# Patient Record
Sex: MALE | Race: BLACK OR AFRICAN AMERICAN | Employment: FULL TIME | URBAN - METROPOLITAN AREA
[De-identification: names, ages, dates, MRNs, and addresses within clinical notes are randomized per-mention and may not be internally consistent; named-entity substitution may affect disease eponyms.]

---

## 2021-04-20 ENCOUNTER — HOSPITAL ENCOUNTER (INPATIENT)
Age: 31
LOS: 3 days | Discharge: HOME | DRG: 342 | End: 2021-04-24
Attending: EMERGENCY MEDICINE | Admitting: SURGERY
Payer: COMMERCIAL

## 2021-04-20 ENCOUNTER — APPOINTMENT (OUTPATIENT)
Dept: CT IMAGING | Age: 31
DRG: 342 | End: 2021-04-20
Payer: COMMERCIAL

## 2021-04-20 DIAGNOSIS — K35.30 ACUTE APPENDICITIS WITH LOCALIZED PERITONITIS, WITHOUT PERFORATION, ABSCESS, OR GANGRENE: Primary | ICD-10-CM

## 2021-04-20 LAB
ABSOLUTE BANDS #: 0.15 K/UL (ref 0–1)
ABSOLUTE EOS #: 0 K/UL (ref 0–0.4)
ABSOLUTE IMMATURE GRANULOCYTE: ABNORMAL K/UL (ref 0–0.3)
ABSOLUTE LYMPH #: 1.85 K/UL (ref 1–4.8)
ABSOLUTE MONO #: 0.92 K/UL (ref 0.1–1.3)
ALBUMIN SERPL-MCNC: 4.5 G/DL (ref 3.5–5.2)
ALBUMIN/GLOBULIN RATIO: ABNORMAL (ref 1–2.5)
ALP BLD-CCNC: 162 U/L (ref 40–129)
ALT SERPL-CCNC: 8 U/L (ref 5–41)
ANION GAP SERPL CALCULATED.3IONS-SCNC: 15 MMOL/L (ref 9–17)
AST SERPL-CCNC: 16 U/L
BANDS: 1 % (ref 0–10)
BASOPHILS # BLD: 0 % (ref 0–2)
BASOPHILS ABSOLUTE: 0 K/UL (ref 0–0.2)
BILIRUB SERPL-MCNC: 0.47 MG/DL (ref 0.3–1.2)
BUN BLDV-MCNC: 6 MG/DL (ref 6–20)
BUN/CREAT BLD: ABNORMAL (ref 9–20)
CALCIUM SERPL-MCNC: 10 MG/DL (ref 8.6–10.4)
CHLORIDE BLD-SCNC: 97 MMOL/L (ref 98–107)
CO2: 27 MMOL/L (ref 20–31)
CREAT SERPL-MCNC: 0.75 MG/DL (ref 0.7–1.2)
DIFFERENTIAL TYPE: ABNORMAL
EOSINOPHILS RELATIVE PERCENT: 0 % (ref 0–4)
GFR AFRICAN AMERICAN: >60 ML/MIN
GFR NON-AFRICAN AMERICAN: >60 ML/MIN
GFR SERPL CREATININE-BSD FRML MDRD: ABNORMAL ML/MIN/{1.73_M2}
GFR SERPL CREATININE-BSD FRML MDRD: ABNORMAL ML/MIN/{1.73_M2}
GLUCOSE BLD-MCNC: 90 MG/DL (ref 70–99)
HCT VFR BLD CALC: 43.5 % (ref 41–53)
HEMOGLOBIN: 14.6 G/DL (ref 13.5–17.5)
IMMATURE GRANULOCYTES: ABNORMAL %
LYMPHOCYTES # BLD: 12 % (ref 24–44)
MCH RBC QN AUTO: 30.9 PG (ref 26–34)
MCHC RBC AUTO-ENTMCNC: 33.5 G/DL (ref 31–37)
MCV RBC AUTO: 92.3 FL (ref 80–100)
MONOCYTES # BLD: 6 % (ref 1–7)
MORPHOLOGY: NORMAL
NRBC AUTOMATED: ABNORMAL PER 100 WBC
PDW BLD-RTO: 13.6 % (ref 11.5–14.9)
PLATELET # BLD: 473 K/UL (ref 150–450)
PLATELET ESTIMATE: ABNORMAL
PMV BLD AUTO: 7.3 FL (ref 6–12)
POTASSIUM SERPL-SCNC: 3.9 MMOL/L (ref 3.7–5.3)
RBC # BLD: 4.72 M/UL (ref 4.5–5.9)
RBC # BLD: ABNORMAL 10*6/UL
SEG NEUTROPHILS: 81 % (ref 36–66)
SEGMENTED NEUTROPHILS ABSOLUTE COUNT: 12.48 K/UL (ref 1.3–9.1)
SODIUM BLD-SCNC: 139 MMOL/L (ref 135–144)
TOTAL PROTEIN: 8.6 G/DL (ref 6.4–8.3)
WBC # BLD: 15.4 K/UL (ref 3.5–11)
WBC # BLD: ABNORMAL 10*3/UL

## 2021-04-20 PROCEDURE — 85025 COMPLETE CBC W/AUTO DIFF WBC: CPT

## 2021-04-20 PROCEDURE — 36415 COLL VENOUS BLD VENIPUNCTURE: CPT

## 2021-04-20 PROCEDURE — 99284 EMERGENCY DEPT VISIT MOD MDM: CPT

## 2021-04-20 PROCEDURE — 2580000003 HC RX 258: Performed by: EMERGENCY MEDICINE

## 2021-04-20 PROCEDURE — 74177 CT ABD & PELVIS W/CONTRAST: CPT

## 2021-04-20 PROCEDURE — 6360000004 HC RX CONTRAST MEDICATION: Performed by: EMERGENCY MEDICINE

## 2021-04-20 PROCEDURE — 96372 THER/PROPH/DIAG INJ SC/IM: CPT

## 2021-04-20 PROCEDURE — 80053 COMPREHEN METABOLIC PANEL: CPT

## 2021-04-20 RX ORDER — SODIUM CHLORIDE 0.9 % (FLUSH) 0.9 %
10 SYRINGE (ML) INJECTION PRN
Status: DISCONTINUED | OUTPATIENT
Start: 2021-04-20 | End: 2021-04-24 | Stop reason: HOSPADM

## 2021-04-20 RX ORDER — DICYCLOMINE HYDROCHLORIDE 10 MG/ML
20 INJECTION INTRAMUSCULAR ONCE
Status: COMPLETED | OUTPATIENT
Start: 2021-04-20 | End: 2021-04-21

## 2021-04-20 RX ORDER — 0.9 % SODIUM CHLORIDE 0.9 %
80 INTRAVENOUS SOLUTION INTRAVENOUS ONCE
Status: COMPLETED | OUTPATIENT
Start: 2021-04-20 | End: 2021-04-20

## 2021-04-20 RX ADMIN — SODIUM CHLORIDE, PRESERVATIVE FREE 10 ML: 5 INJECTION INTRAVENOUS at 23:09

## 2021-04-20 RX ADMIN — IOPAMIDOL 75 ML: 755 INJECTION, SOLUTION INTRAVENOUS at 23:09

## 2021-04-20 RX ADMIN — SODIUM CHLORIDE 80 ML: 9 INJECTION, SOLUTION INTRAVENOUS at 23:09

## 2021-04-20 ASSESSMENT — ENCOUNTER SYMPTOMS
ABDOMINAL PAIN: 1
NAUSEA: 1
SHORTNESS OF BREATH: 0
COUGH: 0
DIARRHEA: 1
VOMITING: 0
ABDOMINAL DISTENTION: 1

## 2021-04-20 ASSESSMENT — PAIN SCALES - GENERAL: PAINLEVEL_OUTOF10: 10

## 2021-04-21 ENCOUNTER — ANESTHESIA EVENT (OUTPATIENT)
Dept: OPERATING ROOM | Age: 31
DRG: 342 | End: 2021-04-21
Payer: COMMERCIAL

## 2021-04-21 ENCOUNTER — ANESTHESIA (OUTPATIENT)
Dept: OPERATING ROOM | Age: 31
DRG: 342 | End: 2021-04-21
Payer: COMMERCIAL

## 2021-04-21 VITALS — SYSTOLIC BLOOD PRESSURE: 175 MMHG | TEMPERATURE: 99.3 F | DIASTOLIC BLOOD PRESSURE: 94 MMHG | OXYGEN SATURATION: 98 %

## 2021-04-21 PROBLEM — K35.80 ACUTE APPENDICITIS: Status: ACTIVE | Noted: 2021-04-21

## 2021-04-21 LAB
C DIFFICILE TOXINS, PCR: ABNORMAL
DIRECT EXAM: NORMAL
HCG QUALITATIVE: NEGATIVE
Lab: NORMAL
Lab: NORMAL
SARS-COV-2, RAPID: NOT DETECTED
SPECIMEN DESCRIPTION: ABNORMAL
SPECIMEN DESCRIPTION: NORMAL

## 2021-04-21 PROCEDURE — 6370000000 HC RX 637 (ALT 250 FOR IP): Performed by: SURGERY

## 2021-04-21 PROCEDURE — 3600000013 HC SURGERY LEVEL 3 ADDTL 15MIN: Performed by: SURGERY

## 2021-04-21 PROCEDURE — 3600000003 HC SURGERY LEVEL 3 BASE: Performed by: SURGERY

## 2021-04-21 PROCEDURE — 87328 CRYPTOSPORIDIUM AG IA: CPT

## 2021-04-21 PROCEDURE — 2720000010 HC SURG SUPPLY STERILE: Performed by: SURGERY

## 2021-04-21 PROCEDURE — 2709999900 HC NON-CHARGEABLE SUPPLY: Performed by: SURGERY

## 2021-04-21 PROCEDURE — 96372 THER/PROPH/DIAG INJ SC/IM: CPT

## 2021-04-21 PROCEDURE — 2580000003 HC RX 258: Performed by: SURGERY

## 2021-04-21 PROCEDURE — 87425 ROTAVIRUS AG IA: CPT

## 2021-04-21 PROCEDURE — 87329 GIARDIA AG IA: CPT

## 2021-04-21 PROCEDURE — 3700000001 HC ADD 15 MINUTES (ANESTHESIA): Performed by: SURGERY

## 2021-04-21 PROCEDURE — 2580000003 HC RX 258: Performed by: EMERGENCY MEDICINE

## 2021-04-21 PROCEDURE — 88304 TISSUE EXAM BY PATHOLOGIST: CPT

## 2021-04-21 PROCEDURE — 36415 COLL VENOUS BLD VENIPUNCTURE: CPT

## 2021-04-21 PROCEDURE — 96366 THER/PROPH/DIAG IV INF ADDON: CPT

## 2021-04-21 PROCEDURE — 1200000000 HC SEMI PRIVATE

## 2021-04-21 PROCEDURE — 6360000002 HC RX W HCPCS: Performed by: ANESTHESIOLOGY

## 2021-04-21 PROCEDURE — 87635 SARS-COV-2 COVID-19 AMP PRB: CPT

## 2021-04-21 PROCEDURE — 6360000002 HC RX W HCPCS: Performed by: EMERGENCY MEDICINE

## 2021-04-21 PROCEDURE — 7100000001 HC PACU RECOVERY - ADDTL 15 MIN: Performed by: SURGERY

## 2021-04-21 PROCEDURE — 6360000002 HC RX W HCPCS: Performed by: SURGERY

## 2021-04-21 PROCEDURE — 96365 THER/PROPH/DIAG IV INF INIT: CPT

## 2021-04-21 PROCEDURE — 2500000003 HC RX 250 WO HCPCS: Performed by: ANESTHESIOLOGY

## 2021-04-21 PROCEDURE — 7100000000 HC PACU RECOVERY - FIRST 15 MIN: Performed by: SURGERY

## 2021-04-21 PROCEDURE — 2500000003 HC RX 250 WO HCPCS: Performed by: SURGERY

## 2021-04-21 PROCEDURE — 94761 N-INVAS EAR/PLS OXIMETRY MLT: CPT

## 2021-04-21 PROCEDURE — 84703 CHORIONIC GONADOTROPIN ASSAY: CPT

## 2021-04-21 PROCEDURE — 0DTJ4ZZ RESECTION OF APPENDIX, PERCUTANEOUS ENDOSCOPIC APPROACH: ICD-10-PCS | Performed by: SURGERY

## 2021-04-21 PROCEDURE — 2580000003 HC RX 258: Performed by: ANESTHESIOLOGY

## 2021-04-21 PROCEDURE — 87493 C DIFF AMPLIFIED PROBE: CPT

## 2021-04-21 PROCEDURE — 3700000000 HC ANESTHESIA ATTENDED CARE: Performed by: SURGERY

## 2021-04-21 RX ORDER — OCTISALATE, AVOBENZONE, HOMOSALATE, AND OCTOCRYLENE 29.4; 29.4; 49; 25.48 MG/ML; MG/ML; MG/ML; MG/ML
LOTION TOPICAL
Status: ON HOLD | COMMUNITY
End: 2021-04-23 | Stop reason: SDUPTHER

## 2021-04-21 RX ORDER — DIPHENHYDRAMINE HYDROCHLORIDE 50 MG/ML
12.5 INJECTION INTRAMUSCULAR; INTRAVENOUS
Status: DISCONTINUED | OUTPATIENT
Start: 2021-04-21 | End: 2021-04-21 | Stop reason: HOSPADM

## 2021-04-21 RX ORDER — HYDRALAZINE HYDROCHLORIDE 20 MG/ML
5 INJECTION INTRAMUSCULAR; INTRAVENOUS EVERY 10 MIN PRN
Status: DISCONTINUED | OUTPATIENT
Start: 2021-04-21 | End: 2021-04-21 | Stop reason: HOSPADM

## 2021-04-21 RX ORDER — MORPHINE SULFATE 2 MG/ML
2 INJECTION, SOLUTION INTRAMUSCULAR; INTRAVENOUS EVERY 5 MIN PRN
Status: DISCONTINUED | OUTPATIENT
Start: 2021-04-21 | End: 2021-04-21 | Stop reason: HOSPADM

## 2021-04-21 RX ORDER — BUPIVACAINE HYDROCHLORIDE 5 MG/ML
INJECTION, SOLUTION EPIDURAL; INTRACAUDAL PRN
Status: DISCONTINUED | OUTPATIENT
Start: 2021-04-21 | End: 2021-04-21 | Stop reason: ALTCHOICE

## 2021-04-21 RX ORDER — ONDANSETRON 4 MG/1
TABLET, FILM COATED ORAL
Qty: 20 TABLET | Refills: 0 | Status: SHIPPED | OUTPATIENT
Start: 2021-04-21 | End: 2021-07-06 | Stop reason: ALTCHOICE

## 2021-04-21 RX ORDER — SODIUM CHLORIDE 9 MG/ML
INJECTION, SOLUTION INTRAVENOUS CONTINUOUS
Status: DISCONTINUED | OUTPATIENT
Start: 2021-04-21 | End: 2021-04-24 | Stop reason: HOSPADM

## 2021-04-21 RX ORDER — ACETAMINOPHEN 325 MG/1
650 TABLET ORAL EVERY 4 HOURS PRN
Status: DISCONTINUED | OUTPATIENT
Start: 2021-04-21 | End: 2021-04-24 | Stop reason: HOSPADM

## 2021-04-21 RX ORDER — LABETALOL HYDROCHLORIDE 5 MG/ML
5 INJECTION, SOLUTION INTRAVENOUS EVERY 10 MIN PRN
Status: DISCONTINUED | OUTPATIENT
Start: 2021-04-21 | End: 2021-04-21 | Stop reason: HOSPADM

## 2021-04-21 RX ORDER — HYDROMORPHONE HCL 110MG/55ML
PATIENT CONTROLLED ANALGESIA SYRINGE INTRAVENOUS PRN
Status: DISCONTINUED | OUTPATIENT
Start: 2021-04-21 | End: 2021-04-21 | Stop reason: SDUPTHER

## 2021-04-21 RX ORDER — LIDOCAINE HYDROCHLORIDE 10 MG/ML
INJECTION, SOLUTION EPIDURAL; INFILTRATION; INTRACAUDAL; PERINEURAL PRN
Status: DISCONTINUED | OUTPATIENT
Start: 2021-04-21 | End: 2021-04-21 | Stop reason: SDUPTHER

## 2021-04-21 RX ORDER — GLYCOPYRROLATE 1 MG/5 ML
SYRINGE (ML) INTRAVENOUS PRN
Status: DISCONTINUED | OUTPATIENT
Start: 2021-04-21 | End: 2021-04-21 | Stop reason: SDUPTHER

## 2021-04-21 RX ORDER — SODIUM CHLORIDE 0.9 % (FLUSH) 0.9 %
10 SYRINGE (ML) INJECTION PRN
Status: DISCONTINUED | OUTPATIENT
Start: 2021-04-21 | End: 2021-04-24 | Stop reason: HOSPADM

## 2021-04-21 RX ORDER — ONDANSETRON 2 MG/ML
INJECTION INTRAMUSCULAR; INTRAVENOUS PRN
Status: DISCONTINUED | OUTPATIENT
Start: 2021-04-21 | End: 2021-04-21 | Stop reason: SDUPTHER

## 2021-04-21 RX ORDER — PROPOFOL 10 MG/ML
INJECTION, EMULSION INTRAVENOUS PRN
Status: DISCONTINUED | OUTPATIENT
Start: 2021-04-21 | End: 2021-04-21 | Stop reason: SDUPTHER

## 2021-04-21 RX ORDER — ROCURONIUM BROMIDE 10 MG/ML
INJECTION, SOLUTION INTRAVENOUS PRN
Status: DISCONTINUED | OUTPATIENT
Start: 2021-04-21 | End: 2021-04-21 | Stop reason: SDUPTHER

## 2021-04-21 RX ORDER — SODIUM CHLORIDE 0.9 % (FLUSH) 0.9 %
10 SYRINGE (ML) INJECTION EVERY 12 HOURS SCHEDULED
Status: DISCONTINUED | OUTPATIENT
Start: 2021-04-21 | End: 2021-04-24 | Stop reason: HOSPADM

## 2021-04-21 RX ORDER — METOCLOPRAMIDE HYDROCHLORIDE 5 MG/ML
10 INJECTION INTRAMUSCULAR; INTRAVENOUS
Status: DISCONTINUED | OUTPATIENT
Start: 2021-04-21 | End: 2021-04-21 | Stop reason: HOSPADM

## 2021-04-21 RX ORDER — CEFAZOLIN SODIUM 1 G/3ML
INJECTION, POWDER, FOR SOLUTION INTRAMUSCULAR; INTRAVENOUS PRN
Status: DISCONTINUED | OUTPATIENT
Start: 2021-04-21 | End: 2021-04-21 | Stop reason: SDUPTHER

## 2021-04-21 RX ORDER — ONDANSETRON 2 MG/ML
4 INJECTION INTRAMUSCULAR; INTRAVENOUS
Status: DISCONTINUED | OUTPATIENT
Start: 2021-04-21 | End: 2021-04-21 | Stop reason: HOSPADM

## 2021-04-21 RX ORDER — KETOROLAC TROMETHAMINE 30 MG/ML
INJECTION, SOLUTION INTRAMUSCULAR; INTRAVENOUS PRN
Status: DISCONTINUED | OUTPATIENT
Start: 2021-04-21 | End: 2021-04-21 | Stop reason: SDUPTHER

## 2021-04-21 RX ORDER — ONDANSETRON 2 MG/ML
4 INJECTION INTRAMUSCULAR; INTRAVENOUS EVERY 6 HOURS PRN
Status: DISCONTINUED | OUTPATIENT
Start: 2021-04-21 | End: 2021-04-24 | Stop reason: HOSPADM

## 2021-04-21 RX ORDER — FENTANYL CITRATE 50 UG/ML
INJECTION, SOLUTION INTRAMUSCULAR; INTRAVENOUS PRN
Status: DISCONTINUED | OUTPATIENT
Start: 2021-04-21 | End: 2021-04-21 | Stop reason: SDUPTHER

## 2021-04-21 RX ORDER — SODIUM CHLORIDE 9 MG/ML
25 INJECTION, SOLUTION INTRAVENOUS PRN
Status: DISCONTINUED | OUTPATIENT
Start: 2021-04-21 | End: 2021-04-24 | Stop reason: HOSPADM

## 2021-04-21 RX ORDER — HYDROCODONE BITARTRATE AND ACETAMINOPHEN 5; 325 MG/1; MG/1
1 TABLET ORAL PRN
Status: DISCONTINUED | OUTPATIENT
Start: 2021-04-21 | End: 2021-04-21 | Stop reason: HOSPADM

## 2021-04-21 RX ORDER — DEXAMETHASONE SODIUM PHOSPHATE 4 MG/ML
INJECTION, SOLUTION INTRA-ARTICULAR; INTRALESIONAL; INTRAMUSCULAR; INTRAVENOUS; SOFT TISSUE PRN
Status: DISCONTINUED | OUTPATIENT
Start: 2021-04-21 | End: 2021-04-21 | Stop reason: SDUPTHER

## 2021-04-21 RX ORDER — SODIUM CHLORIDE, SODIUM LACTATE, POTASSIUM CHLORIDE, CALCIUM CHLORIDE 600; 310; 30; 20 MG/100ML; MG/100ML; MG/100ML; MG/100ML
INJECTION, SOLUTION INTRAVENOUS CONTINUOUS PRN
Status: DISCONTINUED | OUTPATIENT
Start: 2021-04-21 | End: 2021-04-21 | Stop reason: SDUPTHER

## 2021-04-21 RX ORDER — DICYCLOMINE HYDROCHLORIDE 10 MG/1
10 CAPSULE ORAL
Status: ON HOLD | COMMUNITY
End: 2021-04-23 | Stop reason: HOSPADM

## 2021-04-21 RX ORDER — POTASSIUM CHLORIDE 7.45 MG/ML
10 INJECTION INTRAVENOUS PRN
Status: DISCONTINUED | OUTPATIENT
Start: 2021-04-21 | End: 2021-04-24 | Stop reason: HOSPADM

## 2021-04-21 RX ORDER — HYDROCODONE BITARTRATE AND ACETAMINOPHEN 5; 325 MG/1; MG/1
2 TABLET ORAL PRN
Status: DISCONTINUED | OUTPATIENT
Start: 2021-04-21 | End: 2021-04-21 | Stop reason: HOSPADM

## 2021-04-21 RX ORDER — MAGNESIUM SULFATE 1 G/100ML
1000 INJECTION INTRAVENOUS PRN
Status: DISCONTINUED | OUTPATIENT
Start: 2021-04-21 | End: 2021-04-24 | Stop reason: HOSPADM

## 2021-04-21 RX ORDER — FENTANYL CITRATE 50 UG/ML
50 INJECTION, SOLUTION INTRAMUSCULAR; INTRAVENOUS EVERY 5 MIN PRN
Status: DISCONTINUED | OUTPATIENT
Start: 2021-04-21 | End: 2021-04-21 | Stop reason: HOSPADM

## 2021-04-21 RX ORDER — NEOSTIGMINE METHYLSULFATE 5 MG/5 ML
SYRINGE (ML) INTRAVENOUS PRN
Status: DISCONTINUED | OUTPATIENT
Start: 2021-04-21 | End: 2021-04-21 | Stop reason: SDUPTHER

## 2021-04-21 RX ORDER — OXYCODONE HYDROCHLORIDE AND ACETAMINOPHEN 5; 325 MG/1; MG/1
1 TABLET ORAL EVERY 6 HOURS PRN
Qty: 28 TABLET | Refills: 0 | Status: SHIPPED | OUTPATIENT
Start: 2021-04-21 | End: 2021-04-23 | Stop reason: HOSPADM

## 2021-04-21 RX ORDER — MIDAZOLAM HYDROCHLORIDE 1 MG/ML
INJECTION INTRAMUSCULAR; INTRAVENOUS PRN
Status: DISCONTINUED | OUTPATIENT
Start: 2021-04-21 | End: 2021-04-21 | Stop reason: SDUPTHER

## 2021-04-21 RX ORDER — MEPERIDINE HYDROCHLORIDE 25 MG/ML
12.5 INJECTION INTRAMUSCULAR; INTRAVENOUS; SUBCUTANEOUS EVERY 5 MIN PRN
Status: DISCONTINUED | OUTPATIENT
Start: 2021-04-21 | End: 2021-04-21 | Stop reason: HOSPADM

## 2021-04-21 RX ORDER — METOCLOPRAMIDE HYDROCHLORIDE 5 MG/ML
10 INJECTION INTRAMUSCULAR; INTRAVENOUS EVERY 6 HOURS
Status: DISCONTINUED | OUTPATIENT
Start: 2021-04-21 | End: 2021-04-24 | Stop reason: HOSPADM

## 2021-04-21 RX ORDER — FENTANYL CITRATE 50 UG/ML
25 INJECTION, SOLUTION INTRAMUSCULAR; INTRAVENOUS EVERY 5 MIN PRN
Status: DISCONTINUED | OUTPATIENT
Start: 2021-04-21 | End: 2021-04-21 | Stop reason: HOSPADM

## 2021-04-21 RX ORDER — CEPHALEXIN 500 MG/1
CAPSULE ORAL
Qty: 21 CAPSULE | Refills: 0 | Status: SHIPPED | OUTPATIENT
Start: 2021-04-21 | End: 2021-04-23 | Stop reason: HOSPADM

## 2021-04-21 RX ORDER — IBUPROFEN 800 MG/1
800 TABLET ORAL EVERY 6 HOURS PRN
Status: ON HOLD | COMMUNITY
End: 2021-04-24 | Stop reason: HOSPADM

## 2021-04-21 RX ADMIN — METOCLOPRAMIDE 10 MG: 5 INJECTION, SOLUTION INTRAMUSCULAR; INTRAVENOUS at 16:47

## 2021-04-21 RX ADMIN — KETOROLAC TROMETHAMINE 30 MG: 30 INJECTION, SOLUTION INTRAMUSCULAR; INTRAVENOUS at 06:33

## 2021-04-21 RX ADMIN — FENTANYL CITRATE 50 MCG: 50 INJECTION, SOLUTION INTRAMUSCULAR; INTRAVENOUS at 05:42

## 2021-04-21 RX ADMIN — ROCURONIUM BROMIDE 50 MG: 10 INJECTION, SOLUTION INTRAVENOUS at 05:21

## 2021-04-21 RX ADMIN — METOCLOPRAMIDE 10 MG: 5 INJECTION, SOLUTION INTRAMUSCULAR; INTRAVENOUS at 21:17

## 2021-04-21 RX ADMIN — PIPERACILLIN SODIUM AND TAZOBACTAM SODIUM 3375 MG: 3; .375 INJECTION, POWDER, LYOPHILIZED, FOR SOLUTION INTRAVENOUS at 16:48

## 2021-04-21 RX ADMIN — FENTANYL CITRATE 100 MCG: 50 INJECTION, SOLUTION INTRAMUSCULAR; INTRAVENOUS at 05:21

## 2021-04-21 RX ADMIN — PIPERACILLIN AND TAZOBACTAM 4500 MG: 4; .5 INJECTION, POWDER, LYOPHILIZED, FOR SOLUTION INTRAVENOUS; PARENTERAL at 01:19

## 2021-04-21 RX ADMIN — FAMOTIDINE 20 MG: 10 INJECTION INTRAVENOUS at 21:17

## 2021-04-21 RX ADMIN — METOCLOPRAMIDE 10 MG: 5 INJECTION, SOLUTION INTRAMUSCULAR; INTRAVENOUS at 09:23

## 2021-04-21 RX ADMIN — DICYCLOMINE HYDROCHLORIDE 20 MG: 20 INJECTION INTRAMUSCULAR at 01:17

## 2021-04-21 RX ADMIN — FAMOTIDINE 20 MG: 10 INJECTION INTRAVENOUS at 09:23

## 2021-04-21 RX ADMIN — PIPERACILLIN SODIUM AND TAZOBACTAM SODIUM 3375 MG: 3; .375 INJECTION, POWDER, LYOPHILIZED, FOR SOLUTION INTRAVENOUS at 09:23

## 2021-04-21 RX ADMIN — PROPOFOL 200 MG: 10 INJECTION, EMULSION INTRAVENOUS at 05:21

## 2021-04-21 RX ADMIN — SODIUM CHLORIDE: 9 INJECTION, SOLUTION INTRAVENOUS at 09:03

## 2021-04-21 RX ADMIN — Medication 4 MG: at 06:45

## 2021-04-21 RX ADMIN — HYDROMORPHONE HYDROCHLORIDE 0.5 MG: 1 INJECTION, SOLUTION INTRAMUSCULAR; INTRAVENOUS; SUBCUTANEOUS at 07:37

## 2021-04-21 RX ADMIN — DEXAMETHASONE SODIUM PHOSPHATE 4 MG: 4 INJECTION, SOLUTION INTRAMUSCULAR; INTRAVENOUS at 05:35

## 2021-04-21 RX ADMIN — Medication 125 MG: at 21:13

## 2021-04-21 RX ADMIN — ONDANSETRON 4 MG: 2 INJECTION, SOLUTION INTRAMUSCULAR; INTRAVENOUS at 06:31

## 2021-04-21 RX ADMIN — HYDROMORPHONE HYDROCHLORIDE 1 MG: 2 INJECTION, SOLUTION INTRAMUSCULAR; INTRAVENOUS; SUBCUTANEOUS at 05:58

## 2021-04-21 RX ADMIN — CEFAZOLIN 2000 MG: 1 INJECTION, POWDER, FOR SOLUTION INTRAMUSCULAR; INTRAVENOUS at 05:37

## 2021-04-21 RX ADMIN — MIDAZOLAM 2 MG: 1 INJECTION INTRAMUSCULAR; INTRAVENOUS at 05:20

## 2021-04-21 RX ADMIN — Medication 0.4 MG: at 06:45

## 2021-04-21 RX ADMIN — HYDROMORPHONE HYDROCHLORIDE 0.5 MG: 1 INJECTION, SOLUTION INTRAMUSCULAR; INTRAVENOUS; SUBCUTANEOUS at 07:30

## 2021-04-21 RX ADMIN — SODIUM CHLORIDE, POTASSIUM CHLORIDE, SODIUM LACTATE AND CALCIUM CHLORIDE: 600; 310; 30; 20 INJECTION, SOLUTION INTRAVENOUS at 05:17

## 2021-04-21 RX ADMIN — LIDOCAINE HYDROCHLORIDE 50 MG: 10 INJECTION, SOLUTION EPIDURAL; INFILTRATION; INTRACAUDAL; PERINEURAL at 05:21

## 2021-04-21 ASSESSMENT — PULMONARY FUNCTION TESTS
PIF_VALUE: 21
PIF_VALUE: 1
PIF_VALUE: 23
PIF_VALUE: 1
PIF_VALUE: 13
PIF_VALUE: 15
PIF_VALUE: 22
PIF_VALUE: 15
PIF_VALUE: 23
PIF_VALUE: 13
PIF_VALUE: 22
PIF_VALUE: 14
PIF_VALUE: 14
PIF_VALUE: 23
PIF_VALUE: 13
PIF_VALUE: 22
PIF_VALUE: 14
PIF_VALUE: 3
PIF_VALUE: 22
PIF_VALUE: 13
PIF_VALUE: 16
PIF_VALUE: 15
PIF_VALUE: 22
PIF_VALUE: 15
PIF_VALUE: 14
PIF_VALUE: 12
PIF_VALUE: 13
PIF_VALUE: 22
PIF_VALUE: 1
PIF_VALUE: 14
PIF_VALUE: 13
PIF_VALUE: 13
PIF_VALUE: 6
PIF_VALUE: 0
PIF_VALUE: 14
PIF_VALUE: 14
PIF_VALUE: 15
PIF_VALUE: 22
PIF_VALUE: 19
PIF_VALUE: 13
PIF_VALUE: 15
PIF_VALUE: 22
PIF_VALUE: 15
PIF_VALUE: 15
PIF_VALUE: 22
PIF_VALUE: 15
PIF_VALUE: 22
PIF_VALUE: 19
PIF_VALUE: 22
PIF_VALUE: 25
PIF_VALUE: 15
PIF_VALUE: 13
PIF_VALUE: 22
PIF_VALUE: 21
PIF_VALUE: 22
PIF_VALUE: 15
PIF_VALUE: 22

## 2021-04-21 ASSESSMENT — ENCOUNTER SYMPTOMS: STRIDOR: 0

## 2021-04-21 ASSESSMENT — PAIN DESCRIPTION - PAIN TYPE: TYPE: SURGICAL PAIN

## 2021-04-21 ASSESSMENT — PAIN SCALES - GENERAL
PAINLEVEL_OUTOF10: 7
PAINLEVEL_OUTOF10: 0
PAINLEVEL_OUTOF10: 0
PAINLEVEL_OUTOF10: 6

## 2021-04-21 ASSESSMENT — LIFESTYLE VARIABLES: SMOKING_STATUS: 1

## 2021-04-21 ASSESSMENT — PAIN DESCRIPTION - ORIENTATION
ORIENTATION: MID
ORIENTATION: MID

## 2021-04-21 ASSESSMENT — PAIN DESCRIPTION - LOCATION: LOCATION: INCISION;ABDOMEN

## 2021-04-21 NOTE — ANESTHESIA PRE PROCEDURE
mass index is 29.95 kg/m². CBC:   Lab Results   Component Value Date    WBC 15.4 04/20/2021    RBC 4.72 04/20/2021    HGB 14.6 04/20/2021    HCT 43.5 04/20/2021    MCV 92.3 04/20/2021    RDW 13.6 04/20/2021     04/20/2021       CMP:   Lab Results   Component Value Date     04/20/2021    K 3.9 04/20/2021    CL 97 04/20/2021    CO2 27 04/20/2021    BUN 6 04/20/2021    CREATININE 0.75 04/20/2021    GFRAA >60 04/20/2021    LABGLOM >60 04/20/2021    GLUCOSE 90 04/20/2021    PROT 8.6 04/20/2021    CALCIUM 10.0 04/20/2021    BILITOT 0.47 04/20/2021    ALKPHOS 162 04/20/2021    AST 16 04/20/2021    ALT 8 04/20/2021       POC Tests: No results for input(s): POCGLU, POCNA, POCK, POCCL, POCBUN, POCHEMO, POCHCT in the last 72 hours. Coags: No results found for: PROTIME, INR, APTT    HCG (If Applicable): No results found for: PREGTESTUR, PREGSERUM, HCG, HCGQUANT     ABGs: No results found for: PHART, PO2ART, ZYL2YTS, REO8XES, BEART, Q1HOIJGC     Type & Screen (If Applicable):  No results found for: LABABO, LABRH    Drug/Infectious Status (If Applicable):  No results found for: HIV, HEPCAB    COVID-19 Screening (If Applicable): No results found for: COVID19        Anesthesia Evaluation  Patient summary reviewed and Nursing notes reviewed  Airway: Mallampati: II  TM distance: >3 FB   Neck ROM: full  Mouth opening: > = 3 FB Dental:          Pulmonary: breath sounds clear to auscultation  (+) current smoker    (-) rhonchi, wheezes, rales and stridor                           Cardiovascular:Negative CV ROS        (-) murmur, weak pulses,  friction rub, systolic click, carotid bruit,  JVD and peripheral edema      Rhythm: regular  Rate: normal                    Neuro/Psych:   Negative Neuro/Psych ROS              GI/Hepatic/Renal:            ROS comment: Acute appendicitis. Endo/Other: Negative Endo/Other ROS                    Abdominal:           Vascular: negative vascular ROS. Anesthesia Plan      general     ASA 2       Induction: intravenous. MIPS: Postoperative opioids intended and Prophylactic antiemetics administered. Anesthetic plan and risks discussed with patient. Plan discussed with CRNA.                   Bella Holter, MD   4/21/2021

## 2021-04-21 NOTE — CARE COORDINATION
CASE MANAGEMENT NOTE:    Admission Date:  4/20/2021 Kaushal Carpio is a 26 y.o.  male    Admitted for : Acute appendicitis [K35.80]    Met with:  Patient    PCP:  Dr. Jamal Guo:  [facility]      Is patient alert and oriented at time of discussion:  Yes    Current Residence/ Living Arrangements:  independently at home alone and drives             Current Services PTA:  No    Does patient go to outpatient dialysis: No  If yes, location and chair time: n/a    Is patient agreeable to VNS: No    Freedom of choice provided:  Yes    List of [address] provided: No    VNS chosen:  NA    DME:  none    Home Oxygen: No    Nebulizer: No    CPAP/BIPAP: No    Supplier: N/A    Potential Assistance Needed: No    SNF needed: No    Freedom of choice and list provided: NA    Pharmacy:  [address] [place] [address]       Does Patient want to use MEDS to BEDS? No    Is patient currently receiving oral anticoagulation therapy? No    Is the Patient an [facility] with Readmission Risk Score greater than 14%? No  If yes, pt needs a follow up appointment made within 7 days.     Family Members/Caregivers that pt would like involved in their care:    Yes    If yes, list name here:  Tequila Longo    Transportation Provider:  Patient and Family             Discharge Plan:  Home without needs                 Electronically signed by: Nikki Navarro RN on 4/21/2021 at 11:09 AM

## 2021-04-21 NOTE — FLOWSHEET NOTE
Pt arrived to unit. VS stable  and head to toe assessment performed.  Pt resting comfortably in bed call light in reach

## 2021-04-21 NOTE — H&P
General Surgery Consult      Pt Name: Merlyn Sherman  MRN: [id number]  YOB: 1990  Date of evaluation: 4/21/2021  Primary Care Physician: Sai Mcintosh   Patient evaluated at the request of  Dr. Melissa Perrin  Reason for evaluation: Abdominal pain    SUBJECTIVE:   History of Chief Complaint:    Merlyn Sherman is a 26 y.o. male who presents with abdominal pain generalized. Started several days ago. Nausea bloating. No emesis. Diarrhea. No rectal bleeding. No urinary symptoms. No fever chills. Patient has had similar symptoms for the last couple of months. Patient was on antibiotics off and on. Emergency room work-up reviewed. Patient does not appear in any acute distress. No previous abdominal surgeries no major health issues. Patient according to the emergency room physician biologically is a female but registered as a male. Symptom onset has been acute for a time period of few day(s). Severity is described as mild-moderate. Course of his symptoms over time is acute. Past Medical History   has no past medical history on file. Past Surgical History   has no past surgical history on file. Medications  Prior to Admission medications    Not on File     Allergies  has No Known Allergies. Family History  family history is not on file. Social History   reports that he has been smoking cigars. He has smoked for the past 14.00 years. He does not have any smokeless tobacco history on file. He reports current alcohol use. He reports current drug use. Drug: Marijuana.     Review of Systems:  General Denies any fever or chills  HEENT Denies any diplopia, tinnitus or vertigo  Resp Denies any shortness of breath, cough or wheezing  Cardiac Denies any chest pain, palpitations, claudication or edema  GI Denies any melena, hematochezia, hematemesis or pyrosis   Denies any frequency, urgency, hesitancy or incontinence  Heme Denies bruising or bleeding easily  Endocrine Denies any history of diabetes or thyroid disease  Neuro Denies any focal motor or sensory deficits    OBJECTIVE:   VITALS:  height is 5' 5\" (1.651 m) and weight is 180 lb (81.6 kg). His oral temperature is 98.4 °F (36.9 °C). His blood pressure is 135/78 and his pulse is 87. His respiration is 16 and oxygen saturation is 98%. CONSTITUTIONAL: Alert and oriented times 3, no acute distress and cooperative to examination with proper mood and affect. SKIN: Skin color, texture, turgor normal. No rashes or lesions. LYMPH: no cervical nodes, no inguinal nodes  HEENT: Head is normocephalic, atraumatic. EOMI, PERRLA  NECK: Supple, symmetrical, trachea midline, no adenopathy, thyroid symmetric, not enlarged and no tenderness, skin normal  CHEST/LUNGS: chest symmetric with normal A/P diameter, normal respiratory rate and rhythm, lungs clear to auscultation without wheezes, rales or rhonchi. No accessory muscle use. Scars None   CARDIOVASCULAR: Heart regular rate and rhythm Normal S1 and S2. . Carotid and femoral pulses 2+/4 and equal bilaterally  ABDOMEN: Normal shape. . No and Laparoscopic scar(s) present. Normal bowel sounds. No bruits. Soft, nondistended, no masses or organomegaly. no evidence of hernia. Percussion: Normal without hepatosplenomegally. Tenderness: RLQ  RECTAL: deferred, not clinically indicated  NEUROLOGIC: There are no focalizing motor or sensory deficits. CN II-XII are grossly intact.   EXTREMITIES: no cyanosis, no clubbing and no edema    LABS:   CBC with Differential:    Lab Results   Component Value Date    WBC 15.4 04/20/2021    RBC 4.72 04/20/2021    HGB 14.6 04/20/2021    HCT 43.5 04/20/2021     04/20/2021    MCV 92.3 04/20/2021    MCH 30.9 04/20/2021    MCHC 33.5 04/20/2021    RDW 13.6 04/20/2021    LYMPHOPCT 12 04/20/2021    MONOPCT 6 04/20/2021    BASOPCT 0 04/20/2021    MONOSABS 0.92 04/20/2021    LYMPHSABS 1.85 04/20/2021    EOSABS 0.00 04/20/2021    BASOSABS 0.00 04/20/2021    DIFFTYPE NOT REPORTED 04/20/2021     BMP:

## 2021-04-21 NOTE — ED NOTES
Mode of arrival (squad #, walk in, police, etc) : walk in        Chief complaint(s): abdominal pain, diarrhea, bloating        Arrival Note (brief scenario, treatment PTA, etc).: Pt. Reports he has been experiecing abdominal pain and diarrhea for roughly one month after being prescribed keflex. Pt. States this week the pain became unbearable and the pt. Has been unable to eat and has no appetite. C= \"Have you ever felt that you should Cut down on your drinking? \"  No  A= \"Have people Annoyed you by criticizing your drinking? \"  No  G= \"Have you ever felt bad or Guilty about your drinking? \"  No  E= \"Have you ever had a drink as an Eye-opener first thing in the morning to steady your nerves or to help a hangover? \"  No      Deferred []      Reason for deferring: N/A    *If yes to two or more: probable alcohol abuse. Mila Adams RN  04/21/21 4716

## 2021-04-21 NOTE — ED PROVIDER NOTES
[address] [facility]      Pt Name: Nora Avitia  MRN: [id number]  [place] 1990  Date of evaluation: 4/20/21      CHIEF COMPLAINT       Chief Complaint   Patient presents with    Abdominal Pain    Diarrhea    Bloated         HISTORY OF PRESENT ILLNESS   HPI 26 y.o. male presents with c/o generalized addominal pain. Symptoms started 6 days ago. Pain is described as bloating in character, severe in severity (rating it 10 / 10). The pain is located primarily in the across the entire abdomen. Pt started taking keflex. Pt saw pcp today. Prescribed an unknown medication which he hasn't taken yet. The patient reports a history of similar symptoms on and off since February which started after he took a course of keflex. Pt reports about 4-5 episodes of diarrhea a day. REVIEW OF SYSTEMS       Review of Systems   Constitutional: Positive for appetite change and unexpected weight change (15lb wt loss). Negative for fever. HENT: Negative for congestion. Eyes: Negative for visual disturbance. Respiratory: Negative for cough and shortness of breath. Cardiovascular: Negative for chest pain. Gastrointestinal: Positive for abdominal distention, abdominal pain, diarrhea and nausea. Negative for vomiting. Genitourinary: Negative for difficulty urinating. Skin: Negative for rash. Neurological: Negative for headaches. PAST MEDICAL HISTORY   History reviewed. No pertinent past medical history. SURGICAL HISTORY     History reviewed. No pertinent surgical history. CURRENT MEDICATIONS       Current Discharge Medication List          ALLERGIES     has No Known Allergies. FAMILY HISTORY     has no family status information on file. SOCIAL HISTORY      reports that he has been smoking cigars. He has smoked for the past 14.00 years. He does not have any smokeless tobacco history on file. He reports current alcohol use. He reports current drug use.  Drug: for the following components:       Result Value    WBC 15.4 (*)     Platelets 483 (*)     Seg Neutrophils 81 (*)     Lymphocytes 12 (*)     Segs Absolute 12.48 (*)     All other components within normal limits   COMPREHENSIVE METABOLIC PANEL - Abnormal; Notable for the following components:    Chloride 97 (*)     Alkaline Phosphatase 162 (*)     Total Protein 8.6 (*)     All other components within normal limits   COVID-19, RAPID   C. DIFFICILE TOXIN MOLECULAR   HCG, SERUM, QUALITATIVE   GIARDIA / CRYPTOSPORIDUM ANTIGENS   ROTAVIRUS ANTIGEN, STOOL   SURGICAL PATHOLOGY       EMERGENCY DEPARTMENT COURSE:   Vitals:    Vitals:    04/21/21 0700 04/21/21 0710 04/21/21 0720 04/21/21 0730   BP: 133/77 130/64 123/60 130/69   Pulse: 84 76 81 80   Resp: 17 15 14 12   Temp:       TempSrc:       SpO2: 99% 97% 97% 96%   Weight:       Height:           The patient was given the following medications while in the emergency department:  Orders Placed This Encounter   Medications    dicyclomine (BENTYL) injection 20 mg    iopamidol (ISOVUE-370) 76 % injection 75 mL    sodium chloride flush 0.9 % injection 10 mL    0.9 % sodium chloride bolus    DISCONTD: piperacillin-tazobactam (ZOSYN) 3,375 mg in dextrose 5 % 50 mL IVPB (mini-bag)     Order Specific Question:   Antimicrobial Indications     Answer:   Intra-Abdominal Infection    piperacillin-tazobactam (ZOSYN) 4,500 mg in dextrose 5 % 100 mL IVPB (mini-bag)     Order Specific Question:   Antimicrobial Indications     Answer:   Intra-Abdominal Infection    meperidine (DEMEROL) injection 12.5 mg    fentaNYL (SUBLIMAZE) injection 25 mcg    HYDROmorphone (DILAUDID) injection 0.5 mg    morphine (PF) injection 2 mg    fentaNYL (SUBLIMAZE) injection 50 mcg    OR Linked Order Group     HYDROcodone-acetaminophen (NORCO) 5-325 MG per tablet 1 tablet     HYDROcodone-acetaminophen (NORCO) 5-325 MG per tablet 2 tablet    ondansetron (ZOFRAN) injection 4 mg    metoclopramide (REGLAN) injection 10 mg    diphenhydrAMINE (BENADRYL) injection 12.5 mg    labetalol (NORMODYNE;TRANDATE) injection 5 mg    hydrALAZINE (APRESOLINE) injection 5 mg    cephALEXin (KEFLEX) 500 MG capsule     Si mgTake three times daily     Dispense:  21 capsule     Refill:  0    ondansetron (ZOFRAN) 4 MG tablet     Sig: Take every six hours as needed     Dispense:  20 tablet     Refill:  0    oxyCODONE-acetaminophen (PERCOCET) 5-325 MG per tablet     Sig: Take 1 tablet by mouth every 6 hours as needed for Pain for up to 7 days. . Take lowest dose possible to manage pain     Dispense:  28 tablet     Refill:  0     Reduce doses taken as pain becomes manageable    bupivacaine (PF) (MARCAINE) 0.5 % injection     -------------------------  CRITICAL CARE:   CONSULTS: IP CONSULT TO GENERAL SURGERY  IP CONSULT TO CASE MANAGEMENT  IP CONSULT TO SOCIAL WORK  PROCEDURES: Procedures     FINAL IMPRESSION      1. Acute appendicitis with localized peritonitis, without perforation, abscess, or gangrene          DISPOSITION/PLAN   DISPOSITION Decision To Admit 2021 12:34:09 AM      PATIENT REFERRED TO:  No follow-up provider specified. DISCHARGE MEDICATIONS:  Current Discharge Medication List      START taking these medications    Details   cephALEXin (KEFLEX) 500 MG capsule 500 mgTake three times daily  Qty: 21 capsule, Refills: 0      ondansetron (ZOFRAN) 4 MG tablet Take every six hours as needed  Qty: 20 tablet, Refills: 0      oxyCODONE-acetaminophen (PERCOCET) 5-325 MG per tablet Take 1 tablet by mouth every 6 hours as needed for Pain for up to 7 days.  . Take lowest dose possible to manage pain  Qty: 28 tablet, Refills: 0    Comments: Reduce doses taken as pain becomes manageable  Associated Diagnoses: Acute appendicitis with localized peritonitis, without perforation, abscess, or gangrene               Ruma Lopez MD  Attending Emergency Physician                      Ruma Lopez, MD  04/21/21 8361

## 2021-04-21 NOTE — OP NOTE
Operative Note      Patient: Nora Avitia  YOB: 1990  MRN: [id number]    Date of Procedure: 4/21/2021                Preoperative diagnosis: Acute appendicitis    Postoperative diagnosis: Acute suppurative appendicitis    Procedure: Laparoscopic appendectomy    Surgeon: Dr. Ilia Yoder    Asst.: None    Anesthesia: General    Preparation: Chloraprep    EBL: Less than 25 mL    Specimen: Appendix    Findings: Infection present at the time of surgery. Proximal appendix was distended and inflamed. Greater omentum and surrounding fat pad inflamed along with the appendix covering the base of the appendix. Proximal colon including cecum and proximal ascending colon showed inflammatory changes as well. No evidence of rupture at this time. Procedure: Informed consent was obtained. Preoperative antibiotics were given. Patient was taken to the operating room. General anesthesia was given. Jones catheter was placed. Abdomen was prepped and draped in usual sterile fashion. Timeout was done. Subumbilical incision was made. Charlett Gardner port was introduced using the open technique without any difficulty. CO2 insufflation was carried out. Scope was introduced. 2 additional ports were placed in usual fashion in the suprapubic and left lower quadrant of the abdomen under direct visualization. Appendix was visualized and was found to be acutely inflamed. Findings are as noted above. Appendix was grasped with help of a grasper. Mesentery control was obtained using laparoscopic LigaSure. Careful dissection was continued down towards the base of the appendix. Base of the appendix was transected using an Endo FRANCIA stapler with a purple load. Staple line was found to be complete and intact. No bleeding noted from the mesentery or the staple line. Appendix was retrieved using an Endo Catch and sent to pathology for further evaluation. I decided to leave a Vince-Orosco drain in the right lower quadrant.   A #10 Vince-Orosco drain was left in the right lower quadrant was brought out through the 5 mm port site and secured. Satisfactory drain placement was confirmed. All the port sites are visualized. No bleeding noted. All the ports were removed. Fascia and the skin was approximated in the usual fashion. Local anesthetic was infiltrated. Dermabond was applied. Steri-Strips applied. Sterile dressing was applied. Patient tolerated procedure well and was transferred to the recovery room in a stable condition. Recommendations: Given the amount of inflammation in the proximal appendix including the base along with inflammation of the cecum and the proximal ascending colon I will admit the patient for close observation pain control IV antibiotics and bowel rest for now.     -

## 2021-04-21 NOTE — ANESTHESIA POSTPROCEDURE EVALUATION
Department of Anesthesiology  Postprocedure Note    Patient: Nel Castano  MRN: [id number]  YOB: 1990  Date of evaluation: 4/21/2021  Time:  8:16 AM     Procedure Summary     Date: 04/21/21 Room / Location: [address] [place] [address] [facility]    Anesthesia Start: 2514 Anesthesia Stop: 0700    Procedure: APPENDECTOMY LAPAROSCOPIC (N/A Abdomen) Diagnosis: (Acute appendicitis)    Surgeons: Jefferson Hoang MD Responsible Provider: Sara Caballero MD    Anesthesia Type: general ASA Status: 2          Anesthesia Type: general    Xochitl Phase I: Xochitl Score: 10    Xochitl Phase II:      Last vitals: Reviewed and per EMR flowsheets.        Anesthesia Post Evaluation    Comments: POST- ANESTHESIA EVALUATION       Pt Name: Nel Castano  MRN: [id number]  YOB: 1990  Date of evaluation: 4/21/2021  Time:  8:17 AM      /64   Pulse 75   Temp 98.2 °F (36.8 °C) (Infrared)   Resp 10   Ht 5' 5\" (1.651 m)   Wt 180 lb (81.6 kg)   SpO2 96%   BMI 29.95 kg/m²      Consciousness Level  Awake  Cardiopulmonary Status  Stable  Pain Adequately Treated YES  Nausea / Vomiting  NO  Adequate Hydration  YES  Anesthesia Related Complications NONE      Electronically signed by Stephani Klein MD on 4/21/2021 at 8:17 AM

## 2021-04-22 LAB
ABSOLUTE EOS #: 0.1 K/UL (ref 0–0.4)
ABSOLUTE IMMATURE GRANULOCYTE: ABNORMAL K/UL (ref 0–0.3)
ABSOLUTE LYMPH #: 1.4 K/UL (ref 1–4.8)
ABSOLUTE MONO #: 1.5 K/UL (ref 0.1–1.3)
ANION GAP SERPL CALCULATED.3IONS-SCNC: 11 MMOL/L (ref 9–17)
BASOPHILS # BLD: 0 % (ref 0–2)
BASOPHILS ABSOLUTE: 0 K/UL (ref 0–0.2)
BUN BLDV-MCNC: 6 MG/DL (ref 6–20)
BUN/CREAT BLD: ABNORMAL (ref 9–20)
CALCIUM SERPL-MCNC: 8.5 MG/DL (ref 8.6–10.4)
CHLORIDE BLD-SCNC: 102 MMOL/L (ref 98–107)
CO2: 22 MMOL/L (ref 20–31)
CREAT SERPL-MCNC: 0.68 MG/DL (ref 0.7–1.2)
DIFFERENTIAL TYPE: ABNORMAL
EOSINOPHILS RELATIVE PERCENT: 0 % (ref 0–4)
GFR AFRICAN AMERICAN: >60 ML/MIN
GFR NON-AFRICAN AMERICAN: >60 ML/MIN
GFR SERPL CREATININE-BSD FRML MDRD: ABNORMAL ML/MIN/{1.73_M2}
GFR SERPL CREATININE-BSD FRML MDRD: ABNORMAL ML/MIN/{1.73_M2}
GLUCOSE BLD-MCNC: 94 MG/DL (ref 70–99)
HCT VFR BLD CALC: 36.1 % (ref 41–53)
HEMOGLOBIN: 11.9 G/DL (ref 13.5–17.5)
IMMATURE GRANULOCYTES: ABNORMAL %
LYMPHOCYTES # BLD: 10 % (ref 24–44)
MCH RBC QN AUTO: 30.2 PG (ref 26–34)
MCHC RBC AUTO-ENTMCNC: 32.8 G/DL (ref 31–37)
MCV RBC AUTO: 92 FL (ref 80–100)
MONOCYTES # BLD: 11 % (ref 1–7)
NRBC AUTOMATED: ABNORMAL PER 100 WBC
PDW BLD-RTO: 13.3 % (ref 11.5–14.9)
PLATELET # BLD: 365 K/UL (ref 150–450)
PLATELET ESTIMATE: ABNORMAL
PMV BLD AUTO: 7 FL (ref 6–12)
POTASSIUM SERPL-SCNC: 3.8 MMOL/L (ref 3.7–5.3)
RBC # BLD: 3.93 M/UL (ref 4.5–5.9)
RBC # BLD: ABNORMAL 10*6/UL
SEG NEUTROPHILS: 79 % (ref 36–66)
SEGMENTED NEUTROPHILS ABSOLUTE COUNT: 10.5 K/UL (ref 1.3–9.1)
SODIUM BLD-SCNC: 135 MMOL/L (ref 135–144)
SURGICAL PATHOLOGY REPORT: NORMAL
WBC # BLD: 13.5 K/UL (ref 3.5–11)
WBC # BLD: ABNORMAL 10*3/UL

## 2021-04-22 PROCEDURE — 2500000003 HC RX 250 WO HCPCS: Performed by: SURGERY

## 2021-04-22 PROCEDURE — 6360000002 HC RX W HCPCS: Performed by: SURGERY

## 2021-04-22 PROCEDURE — 85025 COMPLETE CBC W/AUTO DIFF WBC: CPT

## 2021-04-22 PROCEDURE — 1200000000 HC SEMI PRIVATE

## 2021-04-22 PROCEDURE — 80048 BASIC METABOLIC PNL TOTAL CA: CPT

## 2021-04-22 PROCEDURE — 2580000003 HC RX 258: Performed by: SURGERY

## 2021-04-22 PROCEDURE — 36415 COLL VENOUS BLD VENIPUNCTURE: CPT

## 2021-04-22 PROCEDURE — 6370000000 HC RX 637 (ALT 250 FOR IP): Performed by: SURGERY

## 2021-04-22 RX ADMIN — Medication 125 MG: at 23:49

## 2021-04-22 RX ADMIN — Medication 125 MG: at 04:03

## 2021-04-22 RX ADMIN — PIPERACILLIN SODIUM AND TAZOBACTAM SODIUM 3375 MG: 3; .375 INJECTION, POWDER, LYOPHILIZED, FOR SOLUTION INTRAVENOUS at 17:04

## 2021-04-22 RX ADMIN — PIPERACILLIN SODIUM AND TAZOBACTAM SODIUM 3375 MG: 3; .375 INJECTION, POWDER, LYOPHILIZED, FOR SOLUTION INTRAVENOUS at 09:29

## 2021-04-22 RX ADMIN — PIPERACILLIN SODIUM AND TAZOBACTAM SODIUM 3375 MG: 3; .375 INJECTION, POWDER, LYOPHILIZED, FOR SOLUTION INTRAVENOUS at 00:27

## 2021-04-22 RX ADMIN — METOCLOPRAMIDE 10 MG: 5 INJECTION, SOLUTION INTRAMUSCULAR; INTRAVENOUS at 09:29

## 2021-04-22 RX ADMIN — Medication 125 MG: at 19:42

## 2021-04-22 RX ADMIN — PIPERACILLIN SODIUM AND TAZOBACTAM SODIUM 3375 MG: 3; .375 INJECTION, POWDER, LYOPHILIZED, FOR SOLUTION INTRAVENOUS at 23:49

## 2021-04-22 RX ADMIN — HYDROMORPHONE HYDROCHLORIDE 1 MG: 1 INJECTION, SOLUTION INTRAMUSCULAR; INTRAVENOUS; SUBCUTANEOUS at 20:33

## 2021-04-22 RX ADMIN — HYDROMORPHONE HYDROCHLORIDE 1 MG: 1 INJECTION, SOLUTION INTRAMUSCULAR; INTRAVENOUS; SUBCUTANEOUS at 23:49

## 2021-04-22 RX ADMIN — FAMOTIDINE 20 MG: 10 INJECTION INTRAVENOUS at 20:33

## 2021-04-22 RX ADMIN — Medication 125 MG: at 11:24

## 2021-04-22 RX ADMIN — SODIUM CHLORIDE, PRESERVATIVE FREE 10 ML: 5 INJECTION INTRAVENOUS at 09:33

## 2021-04-22 RX ADMIN — METOCLOPRAMIDE 10 MG: 5 INJECTION, SOLUTION INTRAMUSCULAR; INTRAVENOUS at 04:03

## 2021-04-22 RX ADMIN — METOCLOPRAMIDE 10 MG: 5 INJECTION, SOLUTION INTRAMUSCULAR; INTRAVENOUS at 20:33

## 2021-04-22 RX ADMIN — METOCLOPRAMIDE 10 MG: 5 INJECTION, SOLUTION INTRAMUSCULAR; INTRAVENOUS at 17:04

## 2021-04-22 RX ADMIN — SODIUM CHLORIDE: 9 INJECTION, SOLUTION INTRAVENOUS at 00:27

## 2021-04-22 RX ADMIN — FAMOTIDINE 20 MG: 10 INJECTION INTRAVENOUS at 09:29

## 2021-04-22 RX ADMIN — SODIUM CHLORIDE: 9 INJECTION, SOLUTION INTRAVENOUS at 20:33

## 2021-04-22 ASSESSMENT — PAIN SCALES - GENERAL
PAINLEVEL_OUTOF10: 4
PAINLEVEL_OUTOF10: 7
PAINLEVEL_OUTOF10: 2
PAINLEVEL_OUTOF10: 7
PAINLEVEL_OUTOF10: 4

## 2021-04-22 ASSESSMENT — PAIN DESCRIPTION - LOCATION: LOCATION: INCISION;ABDOMEN

## 2021-04-22 NOTE — PROGRESS NOTES
Comprehensive Nutrition Assessment    Type and Reason for Visit:  Initial, Positive Nutrition Screen(wt loss, poor appetite)    Nutrition Recommendations/Plan: Will add Ensure Clear to all trays and monitor for advance of diet to General    Nutrition Assessment:  Pt was admitted due to acute appendicitis. He is now s/p (4/21) appendectomy. He states minimal intake x 6 days prior to admit with poor appetite on and off for the past 2 months. Unable to verify wt loss due to no documented wt history. Malnutrition Assessment:  Malnutrition Status: At risk for malnutrition (Comment)    Context:  Acute Illness     Findings of the 6 clinical characteristics of malnutrition:  Energy Intake:  7 - 50% or less of estimated energy requirements for 5 or more days  Weight Loss:  Unable to assess     Body Fat Loss:  No significant body fat loss     Muscle Mass Loss:  No significant muscle mass loss    Fluid Accumulation:  No significant fluid accumulation     Strength:  Not Performed    Estimated Daily Nutrient Needs:  Energy (kcal): Fred x 1.2= 2000 kcal; Weight Used for Energy Requirements:  Admission     Protein (g):  1.3g/kg= 80 g; Weight Used for Protein Requirements:  Ideal          Nutrition Related Findings:  no edema, Labs/Meds: Reviewed, BM 4/20      Wounds:  Surgical Incision       Current Nutrition Therapies:    DIET CLEAR LIQUID; Anthropometric Measures:  · Height: 5' 5\" (165.1 cm)  · Current Body Weight: 179 lb (81.2 kg)   · Admission Body Weight: 179 lb (81.2 kg)    · Ideal Body Weight: 136 lbs;BMI: 29.8  · BMI Categories: Overweight (BMI 25.0-29. 9)       Nutrition Diagnosis:   · Inadequate oral intake related to lack or limited access to food as evidenced by poor intake prior to admission, NPO or clear liquid status due to medical condition    Nutrition Interventions:   Food and/or Nutrient Delivery:  Continue Current Diet, Start Oral Nutrition Supplement  Nutrition Education/Counseling:  No

## 2021-04-22 NOTE — PROGRESS NOTES
[facility]                 PATIENT NAME: Lino Kelley     TODAY'S DATE: 4/22/2021, 9:51 AM    SUBJECTIVE:  POD#1  Pt seen and examined. Afebrile, VSS. Leukocytosis improving, hemoglobin stable. S/p laparoscopic appendectomy. Patient is doing well postoperatively. C/o mild incisional pain otherwise pain is controlled. He is passing flatus. No N/V. Incisions clean, dry, intact. ANDRIY x 1 serosanguinous. C. Diff positive. OBJECTIVE:   VITALS:  /61   Pulse 56   Temp 98.1 °F (36.7 °C) (Oral)   Resp 16   Ht 5' 5\" (1.651 m)   Wt 179 lb 14.3 oz (81.6 kg)   SpO2 100%   BMI 29.94 kg/m²      INTAKE/OUTPUT:      Intake/Output Summary (Last 24 hours) at 4/22/2021 0951  Last data filed at 4/21/2021 2144  Gross per 24 hour   Intake --   Output 50 ml   Net -50 ml                 CONSTITUTIONAL:  awake and alert. No acute distress  HEART:   RRR  LUNGS:   CTA  ABDOMEN:   Abdomen soft, incisions tender, non-distended  EXTREMITIES:   No pedal edema    Data:  CBC:   Lab Results   Component Value Date    WBC 13.5 04/22/2021    RBC 3.93 04/22/2021    HGB 11.9 04/22/2021    HCT 36.1 04/22/2021    MCV 92.0 04/22/2021    MCH 30.2 04/22/2021    MCHC 32.8 04/22/2021    RDW 13.3 04/22/2021     04/22/2021    MPV 7.0 04/22/2021     BMP:    Lab Results   Component Value Date     04/22/2021    K 3.8 04/22/2021     04/22/2021    CO2 22 04/22/2021    BUN 6 04/22/2021    LABALBU 4.5 04/20/2021    CREATININE 0.68 04/22/2021    CALCIUM 8.5 04/22/2021    GFRAA >60 04/22/2021    LABGLOM >60 04/22/2021    GLUCOSE 94 04/22/2021       Radiology Review:  No new images to review      ASSESSMENT     Active Problems:    Acute appendicitis  Resolved Problems:    * No resolved hospital problems. *      Plan  1. Bowel rest  2. IV Zosyn, PO Vanco  3. Pain management   4. Increase activity, incentive spirometer  5. Surgically stable  6. Continue medical management   7.  Patient was seen and examined. Doing well. Ambulating. No nausea vomiting. Pain is controlled. He feels much better. Abdomen is benign. ANDRIY drain is serosanguineous. No diarrhea. On oral vancomycin. Start liquid diet. Overall stable and doing well.       Electronically signed by Branden Horowitz PA-C  [address] [address]

## 2021-04-23 LAB
ABSOLUTE EOS #: 0.3 K/UL (ref 0–0.4)
ABSOLUTE IMMATURE GRANULOCYTE: ABNORMAL K/UL (ref 0–0.3)
ABSOLUTE LYMPH #: 1.7 K/UL (ref 1–4.8)
ABSOLUTE MONO #: 1.4 K/UL (ref 0.1–1.3)
ANION GAP SERPL CALCULATED.3IONS-SCNC: 8 MMOL/L (ref 9–17)
BASOPHILS # BLD: 1 % (ref 0–2)
BASOPHILS ABSOLUTE: 0.1 K/UL (ref 0–0.2)
BUN BLDV-MCNC: 4 MG/DL (ref 6–20)
BUN/CREAT BLD: ABNORMAL (ref 9–20)
CALCIUM SERPL-MCNC: 8.6 MG/DL (ref 8.6–10.4)
CHLORIDE BLD-SCNC: 104 MMOL/L (ref 98–107)
CO2: 23 MMOL/L (ref 20–31)
CREAT SERPL-MCNC: 0.62 MG/DL (ref 0.7–1.2)
DIFFERENTIAL TYPE: ABNORMAL
EOSINOPHILS RELATIVE PERCENT: 3 % (ref 0–4)
GFR AFRICAN AMERICAN: >60 ML/MIN
GFR NON-AFRICAN AMERICAN: >60 ML/MIN
GFR SERPL CREATININE-BSD FRML MDRD: ABNORMAL ML/MIN/{1.73_M2}
GFR SERPL CREATININE-BSD FRML MDRD: ABNORMAL ML/MIN/{1.73_M2}
GLUCOSE BLD-MCNC: 99 MG/DL (ref 70–99)
HCT VFR BLD CALC: 36.9 % (ref 41–53)
HEMOGLOBIN: 12.4 G/DL (ref 13.5–17.5)
IMMATURE GRANULOCYTES: ABNORMAL %
LYMPHOCYTES # BLD: 15 % (ref 24–44)
MCH RBC QN AUTO: 31.1 PG (ref 26–34)
MCHC RBC AUTO-ENTMCNC: 33.6 G/DL (ref 31–37)
MCV RBC AUTO: 92.6 FL (ref 80–100)
MONOCYTES # BLD: 13 % (ref 1–7)
NRBC AUTOMATED: ABNORMAL PER 100 WBC
PDW BLD-RTO: 13.3 % (ref 11.5–14.9)
PLATELET # BLD: 370 K/UL (ref 150–450)
PLATELET ESTIMATE: ABNORMAL
PMV BLD AUTO: 7.2 FL (ref 6–12)
POTASSIUM SERPL-SCNC: 3.8 MMOL/L (ref 3.7–5.3)
RBC # BLD: 3.99 M/UL (ref 4.5–5.9)
RBC # BLD: ABNORMAL 10*6/UL
SEG NEUTROPHILS: 68 % (ref 36–66)
SEGMENTED NEUTROPHILS ABSOLUTE COUNT: 7.3 K/UL (ref 1.3–9.1)
SODIUM BLD-SCNC: 135 MMOL/L (ref 135–144)
WBC # BLD: 10.7 K/UL (ref 3.5–11)
WBC # BLD: ABNORMAL 10*3/UL

## 2021-04-23 PROCEDURE — 2500000003 HC RX 250 WO HCPCS: Performed by: SURGERY

## 2021-04-23 PROCEDURE — 85025 COMPLETE CBC W/AUTO DIFF WBC: CPT

## 2021-04-23 PROCEDURE — 80048 BASIC METABOLIC PNL TOTAL CA: CPT

## 2021-04-23 PROCEDURE — 36415 COLL VENOUS BLD VENIPUNCTURE: CPT

## 2021-04-23 PROCEDURE — 6370000000 HC RX 637 (ALT 250 FOR IP): Performed by: SURGERY

## 2021-04-23 PROCEDURE — 1200000000 HC SEMI PRIVATE

## 2021-04-23 PROCEDURE — 6360000002 HC RX W HCPCS: Performed by: SURGERY

## 2021-04-23 PROCEDURE — 2580000003 HC RX 258: Performed by: SURGERY

## 2021-04-23 RX ORDER — VANCOMYCIN HYDROCHLORIDE 125 MG/1
125 CAPSULE ORAL 4 TIMES DAILY
Qty: 40 CAPSULE | Refills: 0 | Status: SHIPPED | OUTPATIENT
Start: 2021-04-23 | End: 2021-04-24 | Stop reason: SDUPTHER

## 2021-04-23 RX ORDER — OXYCODONE HYDROCHLORIDE AND ACETAMINOPHEN 5; 325 MG/1; MG/1
1 TABLET ORAL EVERY 4 HOURS PRN
Status: DISCONTINUED | OUTPATIENT
Start: 2021-04-23 | End: 2021-04-24 | Stop reason: HOSPADM

## 2021-04-23 RX ORDER — OCTISALATE, AVOBENZONE, HOMOSALATE, AND OCTOCRYLENE 29.4; 29.4; 49; 25.48 MG/ML; MG/ML; MG/ML; MG/ML
4 LOTION TOPICAL DAILY
Qty: 30 CAPSULE | Refills: 0 | Status: SHIPPED | OUTPATIENT
Start: 2021-04-23 | End: 2021-07-06 | Stop reason: ALTCHOICE

## 2021-04-23 RX ADMIN — METOCLOPRAMIDE 10 MG: 5 INJECTION, SOLUTION INTRAMUSCULAR; INTRAVENOUS at 07:33

## 2021-04-23 RX ADMIN — METOCLOPRAMIDE 10 MG: 5 INJECTION, SOLUTION INTRAMUSCULAR; INTRAVENOUS at 02:16

## 2021-04-23 RX ADMIN — Medication 125 MG: at 05:49

## 2021-04-23 RX ADMIN — Medication 125 MG: at 17:51

## 2021-04-23 RX ADMIN — SODIUM CHLORIDE: 9 INJECTION, SOLUTION INTRAVENOUS at 23:27

## 2021-04-23 RX ADMIN — PIPERACILLIN SODIUM AND TAZOBACTAM SODIUM 3375 MG: 3; .375 INJECTION, POWDER, LYOPHILIZED, FOR SOLUTION INTRAVENOUS at 17:51

## 2021-04-23 RX ADMIN — FAMOTIDINE 20 MG: 10 INJECTION INTRAVENOUS at 07:33

## 2021-04-23 RX ADMIN — Medication 125 MG: at 12:54

## 2021-04-23 RX ADMIN — FAMOTIDINE 20 MG: 10 INJECTION INTRAVENOUS at 21:10

## 2021-04-23 RX ADMIN — PIPERACILLIN SODIUM AND TAZOBACTAM SODIUM 3375 MG: 3; .375 INJECTION, POWDER, LYOPHILIZED, FOR SOLUTION INTRAVENOUS at 07:33

## 2021-04-23 NOTE — DISCHARGE SUMMARY
Physician Discharge Summary     Date of admission: 4/20/2021    Discharge date: 4/23/2021    Admission Diagnosis: Acute appendicitis [K35.80]    Discharge Diagnosis: Acute appendicitis. C. difficile colitis. Brief Hospitalization Details:  Caitlin Edgar is a 26 y.o. male who was admitted for the management of <principal problem not specified>    26-year-old male with abdominal pain acute appendicitis underwent laparoscopic appendectomy uneventfully. Patient was found to have C. difficile positive stools. He was started on treatment. Postoperatively patient did very well. Diet was advanced. Pain is controlled. ANDRIY drain is serosanguineous. Blood work reviewed. Patient will be discharged home tomorrow in a stable condition. Prescriptions called in. Pathology results noted. Reveals benign appendix with lymphoid hyperplasia edema and extensive acute serositis in the proximal portion of the appendix. Current Discharge Medication List      START taking these medications    Details   cephALEXin (KEFLEX) 500 MG capsule 500 mgTake three times daily  Qty: 21 capsule, Refills: 0      ondansetron (ZOFRAN) 4 MG tablet Take every six hours as needed  Qty: 20 tablet, Refills: 0      oxyCODONE-acetaminophen (PERCOCET) 5-325 MG per tablet Take 1 tablet by mouth every 6 hours as needed for Pain for up to 7 days.  . Take lowest dose possible to manage pain  Qty: 28 tablet, Refills: 0    Comments: Reduce doses taken as pain becomes manageable  Associated Diagnoses: Acute appendicitis with localized peritonitis, without perforation, abscess, or gangrene         CONTINUE these medications which have NOT CHANGED    Details   ibuprofen (ADVIL;MOTRIN) 800 MG tablet Take 800 mg by mouth every 6 hours as needed for Pain      dicyclomine (BENTYL) 10 MG capsule Take 10 mg by mouth 4 times daily (before meals and nightly)      Probiotic Product (ALIGN) 4 MG CAPS Take by mouth             Condition at Discharge: good    Electronically signed by Jayne Rosario MD on 4/23/2021 at 7:15 PM

## 2021-04-23 NOTE — PLAN OF CARE
Problem: Pain:  Goal: Control of chronic pain  Description: Control of chronic pain  4/23/2021 0351 by Gretchen Mcmillan RN  Outcome: Met This Shift     Problem: Falls - Risk of:  Goal: Will remain free from falls  Description: Will remain free from falls  4/23/2021 1644 by Thu Ceballos RN  Outcome: Met This Shift  Note: No falls noted this shift. Patient ambulates with x1 staff assistance without difficulty. Bed kept in low position. Safe environment maintained. Bedside table & call light in reach. Uses call light appropriately when needing assistance. 4/23/2021 0351 by Gretchen Mcmillan RN  Outcome: Met This Shift  Goal: Absence of physical injury  Description: Absence of physical injury  4/23/2021 0351 by Gretchen Mcmillan RN  Outcome: Met This Shift     Problem: Pain:  Goal: Pain level will decrease  Description: Pain level will decrease  4/23/2021 1644 by Thu Ceballos RN  Outcome: Ongoing  Note: Pt medicated with pain medication prn. Assessed all pain characteristics including level, type, location, frequency, and onset. Non-pharmacologic interventions offered to pt as well. Pt states pain is tolerable at this time. Will continue to monitor.       4/23/2021 0351 by Gretchen Mcmillan RN  Outcome: Ongoing  Goal: Control of acute pain  Description: Control of acute pain  4/23/2021 0351 by Gretchen Mcmillan RN  Outcome: Ongoing     Problem: Nutrition  Goal: Optimal nutrition therapy  Description: Nutrition Problem #1: Inadequate oral intake  Intervention: Food and/or Nutrient Delivery: Continue Current Diet, Start Oral Nutrition Supplement  Nutritional Goals: po intake greater than 50%     4/23/2021 1644 by Thu Ceballos RN  Outcome: Ongoing  4/23/2021 0351 by Gretchen Mcmillan RN  Outcome: Met This Shift

## 2021-04-23 NOTE — PROGRESS NOTES
Pt is A&Ox4 and ambulating per self. Pt is talkative and alert. Vitals obtained. Pt denies any further needs. No distress noted at this time.

## 2021-04-23 NOTE — PROGRESS NOTES
Two RNs attempted to start new IV on patient, unsuccessful. Call to ICU to see if they can start an IV.

## 2021-04-23 NOTE — PROGRESS NOTES
[facility]                 PATIENT NAME: Nora Avitia     TODAY'S DATE: 4/23/2021, 9:21 AM    SUBJECTIVE:  POD#2  Pt seen and examined. Afebrile, VSS. Leukocytosis improved, hemoglobin stable. Patient is feeling well today. Abdominal pain is well controlled. Had a bowel movement today, more formed than previously. Tolerating clear liquids, no N/V. Incisions clean, dry, intact. ANDRIY x 1 serosanguinous. OBJECTIVE:   VITALS:  /60   Pulse 76   Temp 97.5 °F (36.4 °C) (Oral)   Resp 14   Ht 5' 5\" (1.651 m)   Wt 179 lb 14.3 oz (81.6 kg)   SpO2 100%   BMI 29.94 kg/m²      INTAKE/OUTPUT:      Intake/Output Summary (Last 24 hours) at 4/23/2021 0921  Last data filed at 4/23/2021 0550  Gross per 24 hour   Intake 788 ml   Output 10 ml   Net 778 ml                 CONSTITUTIONAL:  awake and alert. No acute distress  HEART:   RRR  LUNGS:   CTA  ABDOMEN:   Abdomen soft, incisions mildly tender, non-distended  EXTREMITIES:   No pedal edema    Data:  CBC:   Lab Results   Component Value Date    WBC 10.7 04/23/2021    RBC 3.99 04/23/2021    HGB 12.4 04/23/2021    HCT 36.9 04/23/2021    MCV 92.6 04/23/2021    MCH 31.1 04/23/2021    MCHC 33.6 04/23/2021    RDW 13.3 04/23/2021     04/23/2021    MPV 7.2 04/23/2021     BMP:    Lab Results   Component Value Date     04/23/2021    K 3.8 04/23/2021     04/23/2021    CO2 23 04/23/2021    BUN 4 04/23/2021    LABALBU 4.5 04/20/2021    CREATININE 0.62 04/23/2021    CALCIUM 8.6 04/23/2021    GFRAA >60 04/23/2021    LABGLOM >60 04/23/2021    GLUCOSE 99 04/23/2021       Radiology Review:  No new images to review      ASSESSMENT     Active Problems:    Acute appendicitis  Resolved Problems:    * No resolved hospital problems. *      Plan  1. Full liquid diet  2. IV Zosyn, PO Vanco  3. Increase activity, IS  4. Surgically stable  5.  Continue medical management       Electronically signed by Laureen Gaines PA-C  General

## 2021-04-23 NOTE — CARE COORDINATION
DISCHARGE PLANNING NOTE:    Plan is for this patient to return to home    He declines any discharge needs. On IV reglan and IV zosyn    PO vanco for + cdiff    +BM    Full liquid diet.     Electronically signed by Mariajose Wyatt RN on 4/23/2021 at 2:52 PM

## 2021-04-24 VITALS
OXYGEN SATURATION: 99 % | WEIGHT: 179.9 LBS | HEIGHT: 65 IN | DIASTOLIC BLOOD PRESSURE: 49 MMHG | SYSTOLIC BLOOD PRESSURE: 109 MMHG | RESPIRATION RATE: 14 BRPM | TEMPERATURE: 98.1 F | HEART RATE: 73 BPM | BODY MASS INDEX: 29.97 KG/M2

## 2021-04-24 LAB
ABSOLUTE EOS #: 0.3 K/UL (ref 0–0.4)
ABSOLUTE IMMATURE GRANULOCYTE: ABNORMAL K/UL (ref 0–0.3)
ABSOLUTE LYMPH #: 1.7 K/UL (ref 1–4.8)
ABSOLUTE MONO #: 1.2 K/UL (ref 0.1–1.3)
ANION GAP SERPL CALCULATED.3IONS-SCNC: 11 MMOL/L (ref 9–17)
BASOPHILS # BLD: 1 % (ref 0–2)
BASOPHILS ABSOLUTE: 0.1 K/UL (ref 0–0.2)
BUN BLDV-MCNC: 4 MG/DL (ref 6–20)
BUN/CREAT BLD: ABNORMAL (ref 9–20)
CALCIUM SERPL-MCNC: 8.8 MG/DL (ref 8.6–10.4)
CHLORIDE BLD-SCNC: 106 MMOL/L (ref 98–107)
CO2: 23 MMOL/L (ref 20–31)
CREAT SERPL-MCNC: 0.68 MG/DL (ref 0.7–1.2)
DIFFERENTIAL TYPE: ABNORMAL
EOSINOPHILS RELATIVE PERCENT: 4 % (ref 0–4)
GFR AFRICAN AMERICAN: >60 ML/MIN
GFR NON-AFRICAN AMERICAN: >60 ML/MIN
GFR SERPL CREATININE-BSD FRML MDRD: ABNORMAL ML/MIN/{1.73_M2}
GFR SERPL CREATININE-BSD FRML MDRD: ABNORMAL ML/MIN/{1.73_M2}
GLUCOSE BLD-MCNC: 91 MG/DL (ref 70–99)
HCT VFR BLD CALC: 35.5 % (ref 41–53)
HEMOGLOBIN: 12.2 G/DL (ref 13.5–17.5)
IMMATURE GRANULOCYTES: ABNORMAL %
LYMPHOCYTES # BLD: 19 % (ref 24–44)
MCH RBC QN AUTO: 31.5 PG (ref 26–34)
MCHC RBC AUTO-ENTMCNC: 34.3 G/DL (ref 31–37)
MCV RBC AUTO: 92.1 FL (ref 80–100)
MONOCYTES # BLD: 13 % (ref 1–7)
NRBC AUTOMATED: ABNORMAL PER 100 WBC
PDW BLD-RTO: 13.3 % (ref 11.5–14.9)
PLATELET # BLD: 387 K/UL (ref 150–450)
PLATELET ESTIMATE: ABNORMAL
PMV BLD AUTO: 6.8 FL (ref 6–12)
POTASSIUM SERPL-SCNC: 3.8 MMOL/L (ref 3.7–5.3)
RBC # BLD: 3.86 M/UL (ref 4.5–5.9)
RBC # BLD: ABNORMAL 10*6/UL
SEG NEUTROPHILS: 63 % (ref 36–66)
SEGMENTED NEUTROPHILS ABSOLUTE COUNT: 5.5 K/UL (ref 1.3–9.1)
SODIUM BLD-SCNC: 140 MMOL/L (ref 135–144)
WBC # BLD: 8.6 K/UL (ref 3.5–11)
WBC # BLD: ABNORMAL 10*3/UL

## 2021-04-24 PROCEDURE — 2580000003 HC RX 258: Performed by: SURGERY

## 2021-04-24 PROCEDURE — 80048 BASIC METABOLIC PNL TOTAL CA: CPT

## 2021-04-24 PROCEDURE — 6360000002 HC RX W HCPCS: Performed by: SURGERY

## 2021-04-24 PROCEDURE — 2500000003 HC RX 250 WO HCPCS: Performed by: SURGERY

## 2021-04-24 PROCEDURE — 36415 COLL VENOUS BLD VENIPUNCTURE: CPT

## 2021-04-24 PROCEDURE — 85025 COMPLETE CBC W/AUTO DIFF WBC: CPT

## 2021-04-24 PROCEDURE — 6370000000 HC RX 637 (ALT 250 FOR IP): Performed by: SURGERY

## 2021-04-24 RX ORDER — VANCOMYCIN HYDROCHLORIDE 125 MG/1
125 CAPSULE ORAL 4 TIMES DAILY
Qty: 40 CAPSULE | Refills: 0 | Status: SHIPPED | OUTPATIENT
Start: 2021-04-24 | End: 2021-04-24 | Stop reason: SDUPTHER

## 2021-04-24 RX ORDER — VANCOMYCIN HYDROCHLORIDE 125 MG/1
125 CAPSULE ORAL 4 TIMES DAILY
Qty: 40 CAPSULE | Refills: 0 | Status: SHIPPED | OUTPATIENT
Start: 2021-04-24 | End: 2021-05-08

## 2021-04-24 RX ADMIN — METOCLOPRAMIDE 10 MG: 5 INJECTION, SOLUTION INTRAMUSCULAR; INTRAVENOUS at 04:02

## 2021-04-24 RX ADMIN — PIPERACILLIN SODIUM AND TAZOBACTAM SODIUM 3375 MG: 3; .375 INJECTION, POWDER, LYOPHILIZED, FOR SOLUTION INTRAVENOUS at 07:59

## 2021-04-24 RX ADMIN — PIPERACILLIN SODIUM AND TAZOBACTAM SODIUM 3375 MG: 3; .375 INJECTION, POWDER, LYOPHILIZED, FOR SOLUTION INTRAVENOUS at 01:12

## 2021-04-24 RX ADMIN — Medication 125 MG: at 01:12

## 2021-04-24 RX ADMIN — Medication 125 MG: at 11:41

## 2021-04-24 RX ADMIN — FAMOTIDINE 20 MG: 10 INJECTION INTRAVENOUS at 07:59

## 2021-04-24 RX ADMIN — SODIUM CHLORIDE: 9 INJECTION, SOLUTION INTRAVENOUS at 06:20

## 2021-04-24 RX ADMIN — Medication 125 MG: at 06:19

## 2021-04-24 NOTE — PROGRESS NOTES
Patient was seen and examined. Doing very well. No surgical issues. Tolerating diet. Afebrile vital signs are stable. Abdomen is benign. ANDRIY drain is serosanguineous. Extremity nontender. Blood work was reviewed. Surgically stable for discharge. Discharge instructions were discussed with the patient. Prescriptions called in.

## 2021-04-24 NOTE — PLAN OF CARE
Problem: Pain:  Description: Pain management should include both nonpharmacologic and pharmacologic interventions.   Goal: Pain level will decrease  Description: Pain level will decrease  4/24/2021 1245 by Jade Seth RN  Outcome: Completed  4/24/2021 0450 by Mihir Velazquez RN  Outcome: Ongoing  Goal: Control of acute pain  Description: Control of acute pain  4/24/2021 1245 by Jade Seth RN  Outcome: Completed  4/24/2021 0450 by Mihir Velazquez RN  Outcome: Ongoing  Goal: Control of chronic pain  Description: Control of chronic pain  4/24/2021 1245 by Jade Seth RN  Outcome: Completed  4/24/2021 0450 by Mihir Velazquez RN  Outcome: Ongoing     Problem: Falls - Risk of:  Goal: Will remain free from falls  Description: Will remain free from falls  4/24/2021 1245 by Jade Seth RN  Outcome: Completed  4/24/2021 0450 by Mihir Velazquez RN  Outcome: Ongoing  Goal: Absence of physical injury  Description: Absence of physical injury  4/24/2021 1245 by Jade Seth RN  Outcome: Completed  4/24/2021 0450 by Mihir Velazquez RN  Outcome: Ongoing     Problem: Nutrition  Goal: Optimal nutrition therapy  Description: Nutrition Problem #1: Inadequate oral intake  Intervention: Food and/or Nutrient Delivery: Continue Current Diet, Start Oral Nutrition Supplement  Nutritional Goals: po intake greater than 50%     4/24/2021 1245 by Jade Seth RN  Outcome: Completed  4/24/2021 0450 by Mihir Velazquez RN  Outcome: Ongoing     Problem: Infection - Surgical Site:  Goal: Will show no infection signs and symptoms  Description: Will show no infection signs and symptoms  Outcome: Completed

## 2021-04-24 NOTE — CARE COORDINATION
ONGOING DISCHARGE PLAN:    Patient is alert and oriented x4. Spoke with patient regarding discharge plan and patient confirms that plan is still home where he lives alone but his mother is available to assist him with any needs. VNS continues to be declined and patient stated that he and his mother are able to empty and care for this ANDRIY drain at home. POD #3 Laparoscopic appendectomy. Low fiber diet. PO vanco for + cdiff.     Will continue to follow for additional discharge needs.     Electronically signed by Adele Altman RN on 4/24/2021 at 10:28 AM

## 2021-04-24 NOTE — PLAN OF CARE
Problem: Pain:  Goal: Pain level will decrease  Description: Pain level will decrease  4/24/2021 0450 by Andreina Wesley RN  Outcome: Ongoing  4/23/2021 1644 by Andrea Gaviria RN  Outcome: Ongoing  Note: Pt medicated with pain medication prn. Assessed all pain characteristics including level, type, location, frequency, and onset. Non-pharmacologic interventions offered to pt as well. Pt states pain is tolerable at this time. Will continue to monitor. Goal: Control of acute pain  Description: Control of acute pain  Outcome: Ongoing  Goal: Control of chronic pain  Description: Control of chronic pain  Outcome: Ongoing     Problem: Falls - Risk of:  Goal: Will remain free from falls  Description: Will remain free from falls  4/24/2021 0450 by Andreina Wesley RN  Outcome: Ongoing  4/23/2021 1644 by Andrea Gaviria RN  Outcome: Met This Shift  Note: No falls noted this shift. Patient ambulates with x1 staff assistance without difficulty. Bed kept in low position. Safe environment maintained. Bedside table & call light in reach. Uses call light appropriately when needing assistance.     Goal: Absence of physical injury  Description: Absence of physical injury  Outcome: Ongoing     Problem: Nutrition  Goal: Optimal nutrition therapy  Description: Nutrition Problem #1: Inadequate oral intake  Intervention: Food and/or Nutrient Delivery: Continue Current Diet, Start Oral Nutrition Supplement  Nutritional Goals: po intake greater than 50%     4/24/2021 0450 by Andreina Wesley RN  Outcome: Ongoing  4/23/2021 1644 by Andrea Gaviria RN  Outcome: Ongoing

## 2021-04-24 NOTE — CARE COORDINATION
ONGOING DISCHARGE PLAN:    Writer in communication with Dr Rakesh Nunez regarding oral vancomycin on discharge. Dr Rakesh Nunez requested writer to change the number of days to take this medication on discharge from 10 to 14 days. Writer did this. Writer in communication with patients [employer] and told this prescription would require prior authorization. Writer informed without insurance coverage this medication would have an OOP cost of over 1000$. Writer in communication with [address] [place] [address] and informed of above. Writer given permission from Wylie Dakin to provide patient medication assistance at [facility] for this medication. Prescription, facesheet and medication assist form faxed to [facility] and writer in communication with pharmacy that this was all received and that the vancomycin would be vouchered for this patient.     Patient aware that he will need to get this medication at [facility] at no cost to him and that they close at 3pm.  Bedside RN Megan Browning aware and to discharge patient now and patient stated mother on way to pick him up at the hospital.    Electronically signed by Brett Agustni RN on 4/24/2021 at 12:42 PM

## 2021-07-06 ENCOUNTER — HOSPITAL ENCOUNTER (OUTPATIENT)
Dept: PREADMISSION TESTING | Age: 31
Discharge: HOME | End: 2021-07-10
Payer: COMMERCIAL

## 2021-07-06 VITALS — WEIGHT: 180 LBS | BODY MASS INDEX: 29.99 KG/M2 | HEIGHT: 65 IN

## 2021-07-06 NOTE — PROGRESS NOTES
Patient states he has NOT had the Covid-19 vaccine. Pre-op Instructions For Out-Patient Endoscopy Surgery    Medication Instructions:  · Please stop herbs and any supplements now (includes vitamins and minerals). · Please contact your surgeon and prescribing physician for pre-op instructions for any blood thinners. · Please take the following medications the morning of your surgery with a sip of water:    none    Surgery Instructions:  1. After midnight before surgery:  Do not eat or drink anything, including water, mints, gum, and hard candy. You may brush your teeth without swallowing. No smoking, chewing tobacco, or street drugs. 2. Please shower or bathe before surgery. 3. Please do not wear any cologne, lotion, powder, jewelry, piercings, perfume, makeup, nail polish, hair accessories, or hair spray on the day of surgery. Wear loose comfortable clothing. 4. Leave your valuables at home. Bring a storage case for any glasses/contacts. 5. An adult who is responsible for you MUST drive you home and should be with you for the first 24 hours after surgery. The Day of Surgery:  · Arrive at [facility] at the time directed by your surgeon and check in at the desk. · If you have a living will or healthcare power of , please bring a copy. · You will be taken to the pre-op holding area where you will be prepared for surgery. A physical assessment will be performed by a nurse practitioner or house officer. Your IV will be started and you will meet your anesthesiologist.    · When you go to surgery, your family will be directed to the surgical waiting room, where the doctor should speak with them after your surgery. · After surgery, you will be taken to the recovery room then when you are awake and stable you will go to the short stay unit for preparation to be discharged.   Only your one designated person is allowed to come to short stay for your discharge.

## 2021-07-09 ENCOUNTER — ANESTHESIA EVENT (OUTPATIENT)
Dept: ENDOSCOPY | Age: 31
End: 2021-07-09
Payer: COMMERCIAL

## 2021-07-12 ENCOUNTER — ANESTHESIA (OUTPATIENT)
Dept: ENDOSCOPY | Age: 31
End: 2021-07-12
Payer: COMMERCIAL

## 2021-07-12 ENCOUNTER — HOSPITAL ENCOUNTER (OUTPATIENT)
Age: 31
Setting detail: OUTPATIENT SURGERY
Discharge: HOME | End: 2021-07-12
Attending: SURGERY | Admitting: SURGERY
Payer: COMMERCIAL

## 2021-07-12 VITALS
SYSTOLIC BLOOD PRESSURE: 118 MMHG | HEIGHT: 65 IN | RESPIRATION RATE: 18 BRPM | WEIGHT: 180 LBS | TEMPERATURE: 96.7 F | OXYGEN SATURATION: 98 % | DIASTOLIC BLOOD PRESSURE: 65 MMHG | HEART RATE: 71 BPM | BODY MASS INDEX: 29.99 KG/M2

## 2021-07-12 VITALS — OXYGEN SATURATION: 98 % | TEMPERATURE: 95.7 F | DIASTOLIC BLOOD PRESSURE: 54 MMHG | SYSTOLIC BLOOD PRESSURE: 118 MMHG

## 2021-07-12 LAB
SARS-COV-2, RAPID: NOT DETECTED
SPECIMEN DESCRIPTION: NORMAL

## 2021-07-12 PROCEDURE — 7100000011 HC PHASE II RECOVERY - ADDTL 15 MIN: Performed by: SURGERY

## 2021-07-12 PROCEDURE — 6370000000 HC RX 637 (ALT 250 FOR IP): Performed by: SURGERY

## 2021-07-12 PROCEDURE — 7100000010 HC PHASE II RECOVERY - FIRST 15 MIN: Performed by: SURGERY

## 2021-07-12 PROCEDURE — 7100000001 HC PACU RECOVERY - ADDTL 15 MIN: Performed by: SURGERY

## 2021-07-12 PROCEDURE — 2580000003 HC RX 258: Performed by: ANESTHESIOLOGY

## 2021-07-12 PROCEDURE — 2500000003 HC RX 250 WO HCPCS

## 2021-07-12 PROCEDURE — 7100000030 HC ASPR PHASE II RECOVERY - FIRST 15 MIN: Performed by: SURGERY

## 2021-07-12 PROCEDURE — 3700000000 HC ANESTHESIA ATTENDED CARE: Performed by: SURGERY

## 2021-07-12 PROCEDURE — 87635 SARS-COV-2 COVID-19 AMP PRB: CPT

## 2021-07-12 PROCEDURE — 7100000000 HC PACU RECOVERY - FIRST 15 MIN: Performed by: SURGERY

## 2021-07-12 PROCEDURE — 3609027000 HC COLONOSCOPY: Performed by: SURGERY

## 2021-07-12 PROCEDURE — 3700000001 HC ADD 15 MINUTES (ANESTHESIA): Performed by: SURGERY

## 2021-07-12 PROCEDURE — 7100000031 HC ASPR PHASE II RECOVERY - ADDTL 15 MIN: Performed by: SURGERY

## 2021-07-12 PROCEDURE — 6360000002 HC RX W HCPCS

## 2021-07-12 PROCEDURE — 2709999900 HC NON-CHARGEABLE SUPPLY: Performed by: SURGERY

## 2021-07-12 RX ORDER — LABETALOL HYDROCHLORIDE 5 MG/ML
5 INJECTION, SOLUTION INTRAVENOUS EVERY 10 MIN PRN
Status: DISCONTINUED | OUTPATIENT
Start: 2021-07-12 | End: 2021-07-12 | Stop reason: HOSPADM

## 2021-07-12 RX ORDER — LIDOCAINE HYDROCHLORIDE 10 MG/ML
1 INJECTION, SOLUTION EPIDURAL; INFILTRATION; INTRACAUDAL; PERINEURAL
Status: DISCONTINUED | OUTPATIENT
Start: 2021-07-12 | End: 2021-07-12 | Stop reason: HOSPADM

## 2021-07-12 RX ORDER — ONDANSETRON 2 MG/ML
INJECTION INTRAMUSCULAR; INTRAVENOUS PRN
Status: DISCONTINUED | OUTPATIENT
Start: 2021-07-12 | End: 2021-07-12 | Stop reason: SDUPTHER

## 2021-07-12 RX ORDER — PROPOFOL 10 MG/ML
INJECTION, EMULSION INTRAVENOUS PRN
Status: DISCONTINUED | OUTPATIENT
Start: 2021-07-12 | End: 2021-07-12 | Stop reason: SDUPTHER

## 2021-07-12 RX ORDER — DIPHENHYDRAMINE HYDROCHLORIDE 50 MG/ML
12.5 INJECTION INTRAMUSCULAR; INTRAVENOUS
Status: DISCONTINUED | OUTPATIENT
Start: 2021-07-12 | End: 2021-07-12 | Stop reason: HOSPADM

## 2021-07-12 RX ORDER — SIMETHICONE 20 MG/.3ML
EMULSION ORAL PRN
Status: DISCONTINUED | OUTPATIENT
Start: 2021-07-12 | End: 2021-07-12 | Stop reason: ALTCHOICE

## 2021-07-12 RX ORDER — LIDOCAINE HYDROCHLORIDE 10 MG/ML
INJECTION, SOLUTION EPIDURAL; INFILTRATION; INTRACAUDAL; PERINEURAL PRN
Status: DISCONTINUED | OUTPATIENT
Start: 2021-07-12 | End: 2021-07-12 | Stop reason: SDUPTHER

## 2021-07-12 RX ORDER — PROMETHAZINE HYDROCHLORIDE 25 MG/ML
6.25 INJECTION, SOLUTION INTRAMUSCULAR; INTRAVENOUS
Status: DISCONTINUED | OUTPATIENT
Start: 2021-07-12 | End: 2021-07-12 | Stop reason: CLARIF

## 2021-07-12 RX ORDER — ONDANSETRON 2 MG/ML
4 INJECTION INTRAMUSCULAR; INTRAVENOUS
Status: DISCONTINUED | OUTPATIENT
Start: 2021-07-12 | End: 2021-07-12 | Stop reason: HOSPADM

## 2021-07-12 RX ORDER — SODIUM CHLORIDE, SODIUM LACTATE, POTASSIUM CHLORIDE, CALCIUM CHLORIDE 600; 310; 30; 20 MG/100ML; MG/100ML; MG/100ML; MG/100ML
INJECTION, SOLUTION INTRAVENOUS CONTINUOUS
Status: DISCONTINUED | OUTPATIENT
Start: 2021-07-12 | End: 2021-07-12 | Stop reason: HOSPADM

## 2021-07-12 RX ORDER — SODIUM CHLORIDE 0.9 % (FLUSH) 0.9 %
10 SYRINGE (ML) INJECTION PRN
Status: DISCONTINUED | OUTPATIENT
Start: 2021-07-12 | End: 2021-07-12 | Stop reason: HOSPADM

## 2021-07-12 RX ORDER — DEXAMETHASONE SODIUM PHOSPHATE 4 MG/ML
INJECTION, SOLUTION INTRA-ARTICULAR; INTRALESIONAL; INTRAMUSCULAR; INTRAVENOUS; SOFT TISSUE PRN
Status: DISCONTINUED | OUTPATIENT
Start: 2021-07-12 | End: 2021-07-12 | Stop reason: SDUPTHER

## 2021-07-12 RX ORDER — HYDRALAZINE HYDROCHLORIDE 20 MG/ML
5 INJECTION INTRAMUSCULAR; INTRAVENOUS EVERY 10 MIN PRN
Status: DISCONTINUED | OUTPATIENT
Start: 2021-07-12 | End: 2021-07-12 | Stop reason: HOSPADM

## 2021-07-12 RX ORDER — SODIUM CHLORIDE 9 MG/ML
25 INJECTION, SOLUTION INTRAVENOUS PRN
Status: DISCONTINUED | OUTPATIENT
Start: 2021-07-12 | End: 2021-07-12 | Stop reason: HOSPADM

## 2021-07-12 RX ORDER — 0.9 % SODIUM CHLORIDE 0.9 %
500 INTRAVENOUS SOLUTION INTRAVENOUS
Status: DISCONTINUED | OUTPATIENT
Start: 2021-07-12 | End: 2021-07-12 | Stop reason: HOSPADM

## 2021-07-12 RX ADMIN — PROPOFOL 200 MG: 10 INJECTION, EMULSION INTRAVENOUS at 07:37

## 2021-07-12 RX ADMIN — PROPOFOL 20 MG: 10 INJECTION, EMULSION INTRAVENOUS at 07:48

## 2021-07-12 RX ADMIN — PROPOFOL 10 MG: 10 INJECTION, EMULSION INTRAVENOUS at 07:49

## 2021-07-12 RX ADMIN — ONDANSETRON 4 MG: 2 INJECTION, SOLUTION INTRAMUSCULAR; INTRAVENOUS at 08:06

## 2021-07-12 RX ADMIN — PROPOFOL 30 MG: 10 INJECTION, EMULSION INTRAVENOUS at 07:39

## 2021-07-12 RX ADMIN — LIDOCAINE HYDROCHLORIDE 90 MG: 10 INJECTION, SOLUTION EPIDURAL; INFILTRATION; INTRACAUDAL; PERINEURAL at 07:37

## 2021-07-12 RX ADMIN — SODIUM CHLORIDE, POTASSIUM CHLORIDE, SODIUM LACTATE AND CALCIUM CHLORIDE: 600; 310; 30; 20 INJECTION, SOLUTION INTRAVENOUS at 07:28

## 2021-07-12 RX ADMIN — DEXAMETHASONE SODIUM PHOSPHATE 4 MG: 4 INJECTION, SOLUTION INTRAMUSCULAR; INTRAVENOUS at 07:51

## 2021-07-12 ASSESSMENT — PULMONARY FUNCTION TESTS
PIF_VALUE: 12
PIF_VALUE: 0
PIF_VALUE: 5
PIF_VALUE: 18
PIF_VALUE: 0
PIF_VALUE: 13
PIF_VALUE: 12
PIF_VALUE: 6
PIF_VALUE: 1
PIF_VALUE: 0
PIF_VALUE: 12
PIF_VALUE: 8
PIF_VALUE: 12
PIF_VALUE: 13
PIF_VALUE: 1
PIF_VALUE: 7
PIF_VALUE: 13
PIF_VALUE: 4
PIF_VALUE: 8
PIF_VALUE: 12
PIF_VALUE: 13
PIF_VALUE: 8
PIF_VALUE: 12
PIF_VALUE: 8
PIF_VALUE: 10
PIF_VALUE: 12
PIF_VALUE: 9
PIF_VALUE: 1
PIF_VALUE: 13
PIF_VALUE: 8
PIF_VALUE: 12
PIF_VALUE: 9
PIF_VALUE: 13
PIF_VALUE: 12
PIF_VALUE: 12
PIF_VALUE: 1
PIF_VALUE: 8
PIF_VALUE: 1
PIF_VALUE: 13
PIF_VALUE: 3
PIF_VALUE: 2
PIF_VALUE: 1
PIF_VALUE: 3
PIF_VALUE: 1
PIF_VALUE: 12
PIF_VALUE: 0
PIF_VALUE: 12
PIF_VALUE: 8
PIF_VALUE: 1
PIF_VALUE: 8
PIF_VALUE: 12
PIF_VALUE: 1

## 2021-07-12 ASSESSMENT — PAIN - FUNCTIONAL ASSESSMENT: PAIN_FUNCTIONAL_ASSESSMENT: 0-10

## 2021-07-12 ASSESSMENT — PAIN SCALES - GENERAL
PAINLEVEL_OUTOF10: 0
PAINLEVEL_OUTOF10: 0

## 2021-07-12 ASSESSMENT — LIFESTYLE VARIABLES: SMOKING_STATUS: 1

## 2021-07-12 NOTE — H&P
HISTORY and [facility] [id number]       NAME:  Margoth Corona  MRN: [id number]   YOB: 1990   Date: 7/12/2021   Age: 26 y.o. Gender: male       COMPLAINT AND PRESENT HISTORY:     Margoth Player is 26 y.o.,  male, having a diagnostic colonoscopy. Pt has not had a previous colonoscopy. History of hospital admission in April, 2021 with acute appendicitis, s/p appendectomy. Pt was found to have C-diff colitis during that admission. Pt was having RLQ abdominal pain at that time. He was also experiencing bloating and early satiety. He reports abdominal pain, bloating and early satiety have since resolved. Denies heartburn, nausea, vomiting, diarrhea, constipation, hematochezia or melena. Denies Family Hx of colon cancer. Completed prep. NPO. No medications taken this am. Denies taking any blood thinning medications. Denies chest pain/pressure, palpitations, SOB, recent URI, fever or chills. Narrative   EXAMINATION:   CT OF THE ABDOMEN AND PELVIS WITH CONTRAST 4/20/2021 4:53 pm       TECHNIQUE:   CT of the abdomen and pelvis was performed with the administration of   intravenous contrast. Multiplanar reformatted images are provided for review.    Dose modulation, iterative reconstruction, and/or weight based adjustment of   the mA/kV was utilized to reduce the radiation dose to as low as reasonably   achievable.       COMPARISON:   None.       HISTORY:   ORDERING SYSTEM PROVIDED HISTORY: rlq abdominal pain   TECHNOLOGIST PROVIDED HISTORY:   rlq abdominal pain       Decision Support Exception->Emergency Medical Condition (MA)   Reason for Exam: Intermittent abdominal pain x 1 month   Acuity: Acute   Type of Exam: Initial       FINDINGS:   Lower Chest: The lung bases are clear.       Organs: 2 low-attenuation lesions are present within the right lobe liver,   largest measuring 7 mm, suggesting small cysts or hemangiomas.  The   gallbladder, pancreas, spleen, adrenal glands, and kidneys are normal.       GI/Bowel: Stomach is normal.  Small bowel appears normal without evidence of   obstruction.  The appendix is abnormally thickened, surrounding inflammation,   consistent with acute uncomplicated appendicitis.  No inflammatory change or   wall thickening is present involving the large bowel.       Pelvis: Urinary bladder appears normal.  Uterus is normal in size.  Follicles   are present on the ovaries.       Peritoneum/Retroperitoneum: Trace amount of free fluid is present within the   pelvis.  Inflammatory changes present within the pelvic mesentery.  No free   air is noted.  No aneurysm formation is present.  No pathological adenopathy   is present.       Bones/Soft Tissues: No acute osseous abnormality is present.           Impression   1. CT findings consistent with acute appendicitis, without evidence of   abscess formation or free air.    2.  Critical results were called by Dr. Ligia Garcia to [address] [place] [address]   on 4/20/2021 at 17:49.           PAST MEDICAL HISTORY     Past Medical History:   Diagnosis Date    Carpal tunnel syndrome     bilateral    Clostridium difficile infection 04/21/2021       SURGICAL HISTORY       Past Surgical History:   Procedure Laterality Date    ADENOIDECTOMY      LAPAROSCOPIC APPENDECTOMY N/A 4/21/2021    APPENDECTOMY LAPAROSCOPIC performed by Armando Torres MD at [facility] HISTORY       Family History   Problem Relation Age of Onset    No Known Problems Mother     No Known Problems Father        SOCIAL HISTORY       Social History     Socioeconomic History    Marital status: Single     Spouse name: Not on file    Number of children: Not on file    Years of education: Not on file    Highest education level: Not on file   Occupational History    Not on file   Tobacco Use    Smoking status: Current Every Day Smoker     Packs/day: 2.00     Years: 14.00     Pack years: 28.00     Types: Cigars    Smokeless tobacco: Never Used   Vaping Use Head: Normocephalic and atraumatic. Nose: Nose normal. No congestion. Mouth/Throat:      Mouth: Mucous membranes are moist.      Pharynx: Oropharynx is clear. Eyes:      General: No scleral icterus. Conjunctiva/sclera: Conjunctivae normal.   Cardiovascular:      Rate and Rhythm: Normal rate and regular rhythm. Heart sounds: Normal heart sounds. No murmur heard. No friction rub. No gallop. Pulmonary:      Effort: Pulmonary effort is normal. No respiratory distress. Breath sounds: Normal breath sounds. No wheezing, rhonchi or rales. Abdominal:      General: Bowel sounds are normal. There is no distension. Palpations: Abdomen is soft. Tenderness: There is no abdominal tenderness. There is no guarding. Musculoskeletal:         General: No swelling or tenderness. Cervical back: Neck supple. No tenderness. Right lower leg: No edema. Left lower leg: No edema. Skin:     General: Skin is warm and dry. Coloration: Skin is not jaundiced. Neurological:      General: No focal deficit present. Mental Status: He is alert and oriented to person, place, and time.       Gait: Gait normal.   Psychiatric:         Mood and Affect: Mood normal.        PROVISIONAL DIAGNOSES / SURGERY:      C-DIFF, COLITIS     COLONOSCOPY DIAGNOSTIC    Patient Active Problem List    Diagnosis Date Noted    Acute appendicitis 04/21/2021           ROSE MARIE Ellis CNP on 7/12/2021 at 5:47 AM

## 2021-07-12 NOTE — ANESTHESIA POSTPROCEDURE EVALUATION
Department of Anesthesiology  Postprocedure Note    Patient: Marco Allred  MRN: [id number]  YOB: 1990  Date of evaluation: 7/12/2021  Time:  10:36 AM     Procedure Summary     Date: 07/12/21 Room / Location: [facility] ENDO 04 / [facility]    Anesthesia Start: 7971 Anesthesia Stop: 9371    Procedure: COLONOSCOPY DIAGNOSTIC (N/A Anus) Diagnosis: (C-DIFF, COLITIS  (RAPID COVID TEST ON ADMIT))    Surgeons: Sabrina Richardson MD Responsible Provider: Kathy Torre MD    Anesthesia Type: general ASA Status: 2          Anesthesia Type: general    Xochitl Phase I: Xochitl Score: 10    Xochitl Phase II: Xochitl Score: 10    Last vitals: Reviewed and per EMR flowsheets.        Anesthesia Post Evaluation    Comments: POST- ANESTHESIA EVALUATION       Pt Name: Marco Allred  MRN: [id number]  YOB: 1990  Date of evaluation: 7/12/2021  Time:  10:36 AM      /66   Pulse 67   Temp 96.7 °F (35.9 °C) (Temporal)   Resp 18   Ht 5' 5\" (1.651 m)   Wt 180 lb (81.6 kg)   SpO2 98%   BMI 29.95 kg/m²      Consciousness Level  Awake  Cardiopulmonary Status  Stable  Pain Adequately Treated YES  Nausea / Vomiting  NO  Adequate Hydration  YES  Anesthesia Related Complications NONE      Electronically signed by Kathy Torre MD on 7/12/2021 at 10:36 AM

## 2021-07-12 NOTE — OP NOTE
Operative Note      Patient: Vernon Ernst  YOB: 1990  MRN: [id number]    Date of Procedure: 7/12/2021    PROCEDURE NOTE    DATE OF PROCEDURE: 7/12/2021    SURGEON: Maureen Chavez MD    ASSISTANT: None    PREOPERATIVE DIAGNOSIS: History of colitis    POSTOPERATIVE DIAGNOSIS: No evidence of colitis. Grade 1 internal hemorrhoid. Cecal diverticulum. OPERATION: Total colonoscopy to cecum with intubation of terminal ileum    ANESTHESIA: General    ESTIMATED BLOOD LOSS: None    COMPLICATIONS: None     SPECIMENS:  Was Not Obtained    HISTORY: The patient is a 26y.o. year old male with history of above preop diagnosis. I recommended colonoscopy with possible biopsy or polypectomy and I explained the risk, benefits, expected outcome, and alternatives to the procedure. Risks included but are not limited to bleeding, infection, respiratory distress, hypotension, and perforation of the colon and possibility of missing a lesion. The patient understands and is in agreement. PROCEDURE: The patient was given IV conscious sedation. The patient's SPO2 remained above 90% throughout the procedure. Digital rectal exam was normal.  The colonoscope was inserted through the anus into the rectum and advanced under direct vision to the cecum without difficulty. Terminal ileum was examined for approximately 2 inches. The prep was fair. Findings:  Terminal ileum: normal    Cecum/Ascending colon: abnormal: Cecal diverticulum    Transverse colon: normal    Descending/Sigmoid colon: normal    Rectum/Anus: examined in normal and retroflexed positions and was abnormal: Grade 1 internal hemorrhoid    Withdrawal Time was (minutes): 18      Next screening colonoscopy: 10 years. The colon was decompressed. While withdrawing the scope the above findings were verified and the scope was removed. The patient tolerated the procedure and conscious sedation without unusual events.     In the recovery room patient

## 2021-07-12 NOTE — ANESTHESIA PRE PROCEDURE
Department of Anesthesiology  Preprocedure Note       Name:  Rosalee Mcdonnell   Age:  26 y.o.  :  1990                                          MRN:  [id number]         Date:  2021      Surgeon: Nadja Mario):  Shahid Vidal MD    Procedure: Procedure(s):  COLONOSCOPY DIAGNOSTIC    Medications prior to admission:   Prior to Admission medications    Not on File       Current medications:    Current Facility-Administered Medications   Medication Dose Route Frequency Provider Last Rate Last Admin    lactated ringers infusion   Intravenous Continuous Isabel Jaimes MD        sodium chloride flush 0.9 % injection 10 mL  10 mL Intravenous PRN Isabel Jaimes MD        0.9 % sodium chloride infusion  25 mL Intravenous PRN Isabel Jaimes MD        lidocaine PF 1 % injection 1 mL  1 mL Intradermal Once PRN Shantel Brown MD           Allergies:  No Known Allergies    Problem List:    Patient Active Problem List   Diagnosis Code    Acute appendicitis K35.80       Past Medical History:        Diagnosis Date    Carpal tunnel syndrome     bilateral    Clostridium difficile infection 2021       Past Surgical History:        Procedure Laterality Date    ADENOIDECTOMY      LAPAROSCOPIC APPENDECTOMY N/A 2021    APPENDECTOMY LAPAROSCOPIC performed by Shahid Vidal MD at [address] [place] [address] History:    Social History     Tobacco Use    Smoking status: Current Every Day Smoker     Packs/day: 2.00     Years: 14.00     Pack years: 28.00     Types: Cigars    Smokeless tobacco: Never Used   Substance Use Topics    Alcohol use: Yes     Comment: once a month or less                                Ready to quit: Not Answered  Counseling given: Not Answered      Vital Signs (Current):   Vitals:    21 0625   BP: (!) 111/58   Pulse: 55   Resp: 16   Temp: 97.1 °F (36.2 °C)   TempSrc: Temporal   SpO2: 99%   Weight: 180 lb (81.6 kg)   Height: 5' 5\" (1.651 m) FB Dental:          Pulmonary:normal exam  breath sounds clear to auscultation  (+) current smoker          Patient did not smoke on day of surgery. Cardiovascular:Negative CV ROS  Exercise tolerance: good (>4 METS),           Rhythm: regular  Rate: normal                    Neuro/Psych:   Negative Neuro/Psych ROS              GI/Hepatic/Renal:   (+) bowel prep,          ROS comment: C diff colitis. Endo/Other: Negative Endo/Other ROS                    Abdominal:             Vascular: negative vascular ROS. Other Findings:             Anesthesia Plan      general     ASA 2     (GA per Dr. Elliott Owens)  Induction: intravenous. MIPS: Postoperative opioids intended and Prophylactic antiemetics administered. Anesthetic plan and risks discussed with patient. Plan discussed with CRNA.                   Alverto Walton MD   7/12/2021

## 2022-04-16 ENCOUNTER — APPOINTMENT (OUTPATIENT)
Dept: GENERAL RADIOLOGY | Age: 32
End: 2022-04-16
Payer: COMMERCIAL

## 2022-04-16 ENCOUNTER — APPOINTMENT (OUTPATIENT)
Dept: CT IMAGING | Age: 32
End: 2022-04-16
Payer: COMMERCIAL

## 2022-04-16 ENCOUNTER — HOSPITAL ENCOUNTER (EMERGENCY)
Age: 32
Discharge: HOME | End: 2022-04-16
Attending: EMERGENCY MEDICINE
Payer: COMMERCIAL

## 2022-04-16 VITALS
OXYGEN SATURATION: 98 % | RESPIRATION RATE: 20 BRPM | TEMPERATURE: 97.3 F | DIASTOLIC BLOOD PRESSURE: 80 MMHG | HEART RATE: 94 BPM | SYSTOLIC BLOOD PRESSURE: 119 MMHG
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
## 2022-04-16 LAB
ANION GAP SERPL CALCULATED.3IONS-SCNC: 15 MMOL/L (ref 9–17)
BUN BLDV-MCNC: 7 MG/DL (ref 6–20)
CALCIUM SERPL-MCNC: 8.6 MG/DL (ref 8.6–10.4)
CHLORIDE BLD-SCNC: 105 MMOL/L (ref 98–107)
CO2: 21 MMOL/L (ref 20–31)
CREAT SERPL-MCNC: 0.58 MG/DL (ref 0.7–1.2)
GFR AFRICAN AMERICAN: >60 ML/MIN
GFR NON-AFRICAN AMERICAN: >60 ML/MIN
GFR SERPL CREATININE-BSD FRML MDRD: ABNORMAL ML/MIN/{1.73_M2}
GLUCOSE BLD-MCNC: 77 MG/DL (ref 70–99)
POTASSIUM SERPL-SCNC: 4 MMOL/L (ref 3.7–5.3)
REASON FOR REJECTION: NORMAL
SODIUM BLD-SCNC: 141 MMOL/L (ref 135–144)
ZZ NTE CLEAN UP: ORDERED TEST: NORMAL
ZZ NTE WITH NAME CLEAN UP: SPECIMEN SOURCE: NORMAL

## 2022-04-16 PROCEDURE — 70498 CT ANGIOGRAPHY NECK: CPT

## 2022-04-16 PROCEDURE — 70450 CT HEAD/BRAIN W/O DYE: CPT

## 2022-04-16 PROCEDURE — 80048 BASIC METABOLIC PNL TOTAL CA: CPT

## 2022-04-16 PROCEDURE — 99285 EMERGENCY DEPT VISIT HI MDM: CPT

## 2022-04-16 PROCEDURE — 71046 X-RAY EXAM CHEST 2 VIEWS: CPT

## 2022-04-16 PROCEDURE — 72125 CT NECK SPINE W/O DYE: CPT

## 2022-04-16 PROCEDURE — 70486 CT MAXILLOFACIAL W/O DYE: CPT

## 2022-04-16 PROCEDURE — 6360000004 HC RX CONTRAST MEDICATION: Performed by: STUDENT IN AN ORGANIZED HEALTH CARE EDUCATION/TRAINING PROGRAM

## 2022-04-16 RX ORDER — ACETAMINOPHEN 500 MG
1000 TABLET ORAL ONCE
Status: DISCONTINUED | OUTPATIENT
Start: 2022-04-16 | End: 2022-04-16 | Stop reason: HOSPADM

## 2022-04-16 RX ORDER — KETOROLAC TROMETHAMINE 30 MG/ML
30 INJECTION, SOLUTION INTRAMUSCULAR; INTRAVENOUS ONCE
Status: DISCONTINUED | OUTPATIENT
Start: 2022-04-16 | End: 2022-04-16

## 2022-04-16 RX ORDER — LIDOCAINE 4 G/G
1 PATCH TOPICAL DAILY
Status: DISCONTINUED | OUTPATIENT
Start: 2022-04-16 | End: 2022-04-16 | Stop reason: HOSPADM

## 2022-04-16 RX ORDER — IBUPROFEN 800 MG/1
800 TABLET ORAL EVERY 8 HOURS PRN
Qty: 30 TABLET | Refills: 0 | Status: SHIPPED | OUTPATIENT
Start: 2022-04-16

## 2022-04-16 RX ORDER — ACETAMINOPHEN 325 MG/1
650 TABLET ORAL EVERY 6 HOURS PRN
Qty: 30 TABLET | Refills: 0 | Status: SHIPPED | OUTPATIENT
Start: 2022-04-16

## 2022-04-16 RX ADMIN — IOPAMIDOL 90 ML: 755 INJECTION, SOLUTION INTRAVENOUS at 09:02

## 2022-04-16 ASSESSMENT — ENCOUNTER SYMPTOMS
SHORTNESS OF BREATH: 0
FACIAL SWELLING: 1
BACK PAIN: 0
COUGH: 0
NAUSEA: 0
VOMITING: 0
ABDOMINAL PAIN: 0
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
## 2022-04-16 NOTE — PROGRESS NOTES
I did not participate in the management of this patient    Electronically signed by Renaldo Germain MD on 4/16/2022 at 9:56 AM

## 2022-04-16 NOTE — ED NOTES
Patient states that he has to leave to go get his car. Patients that he will return to have the rest of the testing completed.       Hermilo Joseph RN  04/16/22 1928

## 2022-04-16 NOTE — ED NOTES
Pt sleeping on cot, rr even and non labored, no distress noted at this time. Awaiting lab results and repeat ct scan.       Ani Maharaj RN  04/16/22 2085
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
## 2022-04-16 NOTE — ED NOTES
Patient registered with the social security number given to nurse. Patient verified identity with 2 identifying factors.       Rober Crigler, RN  04/16/22 7049
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
## 2022-04-16 NOTE — ED PROVIDER NOTES
Faculty Sign-Out Attestation  Handoff taken on the following patient from prior Attending Physician: Sridevi Bella    I was available and discussed any additional care issues that arose and coordinated the management plans with the resident(s) caring for the patient during my duty period. Any areas of disagreement with residents documentation of care or procedures are noted on the chart. I was personally present for the key portions of any/all procedures during my duty period. I have documented in the chart those procedures where I was not present during the key portions. CT Head WO Contrast   Preliminary Result   CT head:      1. Incidental area potential enlargement of the right middle cerebral artery   which could represent an aneurysm. Further evaluation with CTA head or MRA   brain is recommended. 2. No acute intracranial abnormality evident by CT. CT facial bones:      1. Moderate to severe soft tissue swelling and bruising/edema of the left pre   maxillary soft tissues. 2. No acute osseous abnormality. 3. Soft tissue swelling overlying the nose. CT cervical spine:      1. No clear evidence for acute fracture or malalignment within the cervical   spine. 2. Minimal calcification of the posterior longitudinal ligament at C6-C7. CT Cervical Spine WO Contrast   Preliminary Result   CT head:      1. Incidental area potential enlargement of the right middle cerebral artery   which could represent an aneurysm. Further evaluation with CTA head or MRA   brain is recommended. 2. No acute intracranial abnormality evident by CT. CT facial bones:      1. Moderate to severe soft tissue swelling and bruising/edema of the left pre   maxillary soft tissues. 2. No acute osseous abnormality. 3. Soft tissue swelling overlying the nose. CT cervical spine:      1. No clear evidence for acute fracture or malalignment within the cervical   spine.    2. Minimal calcification of the posterior longitudinal ligament at C6-C7. CT FACIAL BONES WO CONTRAST   Preliminary Result   CT head:      1. Incidental area potential enlargement of the right middle cerebral artery   which could represent an aneurysm. Further evaluation with CTA head or MRA   brain is recommended. 2. No acute intracranial abnormality evident by CT. CT facial bones:      1. Moderate to severe soft tissue swelling and bruising/edema of the left pre   maxillary soft tissues. 2. No acute osseous abnormality. 3. Soft tissue swelling overlying the nose. CT cervical spine:      1. No clear evidence for acute fracture or malalignment within the cervical   spine. 2. Minimal calcification of the posterior longitudinal ligament at C6-C7. XR CHEST (2 VW)   Final Result   No acute cardiopulmonary process.                Maritza Aquino MD  Attending Physician         Maritza Aquino MD  04/16/22 7184

## 2022-04-16 NOTE — ED NOTES
Report received from [facility]. Pt awaiting ct results with probable d/c  Home.       Maximo Pi, RN  04/16/22 0367

## 2022-04-18 ENCOUNTER — APPOINTMENT (OUTPATIENT)
Dept: GENERAL RADIOLOGY | Age: 32
End: 2022-04-18
Payer: COMMERCIAL

## 2022-04-18 ENCOUNTER — HOSPITAL ENCOUNTER (EMERGENCY)
Age: 32
Discharge: HOME | End: 2022-04-18
Attending: EMERGENCY MEDICINE
Payer: COMMERCIAL

## 2022-04-18 VITALS
HEART RATE: 62 BPM | RESPIRATION RATE: 15 BRPM | DIASTOLIC BLOOD PRESSURE: 86 MMHG | OXYGEN SATURATION: 99 % | TEMPERATURE: 97 F | SYSTOLIC BLOOD PRESSURE: 141 MMHG

## 2022-04-18 DIAGNOSIS — S61.012A LACERATION OF LEFT THUMB, INITIAL ENCOUNTER: Primary | ICD-10-CM

## 2022-04-18 PROCEDURE — 6370000000 HC RX 637 (ALT 250 FOR IP): Performed by: STUDENT IN AN ORGANIZED HEALTH CARE EDUCATION/TRAINING PROGRAM

## 2022-04-18 PROCEDURE — 90471 IMMUNIZATION ADMIN: CPT

## 2022-04-18 PROCEDURE — 99284 EMERGENCY DEPT VISIT MOD MDM: CPT

## 2022-04-18 PROCEDURE — 6360000002 HC RX W HCPCS

## 2022-04-18 PROCEDURE — 73130 X-RAY EXAM OF HAND: CPT

## 2022-04-18 PROCEDURE — 90715 TDAP VACCINE 7 YRS/> IM: CPT

## 2022-04-18 RX ORDER — ACETAMINOPHEN 500 MG
1000 TABLET ORAL ONCE
Status: COMPLETED | OUTPATIENT
Start: 2022-04-18 | End: 2022-04-18

## 2022-04-18 RX ADMIN — TETANUS TOXOID, REDUCED DIPHTHERIA TOXOID AND ACELLULAR PERTUSSIS VACCINE, ADSORBED 0.5 ML: 5; 2.5; 8; 8; 2.5 SUSPENSION INTRAMUSCULAR at 18:04

## 2022-04-18 RX ADMIN — ACETAMINOPHEN 1000 MG: 500 TABLET ORAL at 18:04

## 2022-04-18 ASSESSMENT — ENCOUNTER SYMPTOMS
VOMITING: 0
SHORTNESS OF BREATH: 0
COLOR CHANGE: 0
ABDOMINAL PAIN: 0
NAUSEA: 0

## 2022-04-18 ASSESSMENT — PAIN - FUNCTIONAL ASSESSMENT: PAIN_FUNCTIONAL_ASSESSMENT: 0-10

## 2022-04-18 ASSESSMENT — PAIN SCALES - GENERAL
PAINLEVEL_OUTOF10: 10
PAINLEVEL_OUTOF10: 5

## 2022-04-18 ASSESSMENT — PAIN DESCRIPTION - LOCATION: LOCATION: FINGER (COMMENT WHICH ONE)

## 2022-04-18 NOTE — ED PROVIDER NOTES
[facility]     Emergency Department     Faculty Attestation    I performed a history and physical examination of the patient and discussed management with the resident. I reviewed the resident´s note and agree with the documented findings and plan of care. Any areas of disagreement are noted on the chart. I was personally present for the key portions of any procedures. I have documented in the chart those procedures where I was not present during the key portions. I have reviewed the emergency nurses triage note. I agree with the chief complaint, past medical history, past surgical history, allergies, medications, social and family history as documented unless otherwise noted below. For Physician Assistant/ Nurse Practitioner cases/documentation I have personally evaluated this patient and have completed at least one if not all key elements of the E/M (history, physical exam, and MDM). Additional findings are as noted. Right-hand-dominant, 23-hour old flap laceration in the palmar surface of the tip of the left thumb.      Leonora Moreno MD  04/18/22 2217

## 2022-04-18 NOTE — ED PROVIDER NOTES
[facility] ED  Emergency Department Encounter  Emergency Medicine Resident     Pt Julio César Gamble  MRN: [id number]  [place] 1990  Date of evaluation: 4/18/22  PCP:  Sofia Chambers       Chief Complaint   Patient presents with    Hand Laceration     Lt thumb   Left thumb laceration    HISTORY OF PRESENT ILLNESS  (Location/Symptom, Timing/Onset, Context/Setting, Quality, Duration, Modifying Factors, Severity.)      Zeferino Jean is a 33 y.o. male who presents with 23-hour old left thumb laceration that happened yesterday while cooking. Sliced his thumb, bleeding controlled with direct pressure, washed out at home and bandaged. Presenting today because the wound is continuing to weep and is associated with some pain. Unknown last tetanus, decree sensation around the laceration otherwise no numbness tingling or weakness. Wound was not grossly contaminated at the time of injury, cleaned out shortly after. No purulent drainage, no surrounding erythema per patient. PAST MEDICAL / SURGICAL / SOCIAL / FAMILY HISTORY      has a past medical history of Carpal tunnel syndrome and Clostridium difficile infection. has a past surgical history that includes laparoscopic appendectomy (N/A, 4/21/2021); Adenoidectomy; Tonsillectomy; and Colonoscopy (N/A, 7/12/2021).     Social History     Socioeconomic History    Marital status: Single     Spouse name: Not on file    Number of children: Not on file    Years of education: Not on file    Highest education level: Not on file   Occupational History    Not on file   Tobacco Use    Smoking status: Current Every Day Smoker     Packs/day: 2.00     Years: 14.00     Pack years: 28.00     Types: Cigars    Smokeless tobacco: Never Used   Vaping Use    Vaping Use: Never used   Substance and Sexual Activity    Alcohol use: Yes     Comment: once a month or less    Drug use: Yes     Types: Marijuana Izabela Awkward)     Comment: daily smoking, recreational    Sexual activity: Not on file   Other Topics Concern    Not on file   Social History Narrative    Not on file     Social Determinants of Health     Financial Resource Strain:     Difficulty of Paying Living Expenses: Not on file   Food Insecurity:     Worried About [address] in the Last Year: Not on file    Amos of Food in the Last Year: Not on file   Transportation Needs:     Lack of Transportation (Medical): Not on file    Lack of Transportation (Non-Medical): Not on file   Physical Activity:     Days of Exercise per Week: Not on file    Minutes of Exercise per Session: Not on file   Stress:     Feeling of Stress : Not on file   Social Connections:     Frequency of Communication with Friends and Family: Not on file    Frequency of Social Gatherings with Friends and Family: Not on file    Attends Zoroastrian Services: Not on file    Active Member of [address] [place] [address] or Organizations: Not on file    Attends Club or Organization Meetings: Not on file    Marital Status: Not on file   Intimate Partner Violence:     Fear of Current or Ex-Partner: Not on file    Emotionally Abused: Not on file    Physically Abused: Not on file    Sexually Abused: Not on file   Housing Stability:     Unable to Pay for Housing in the Last Year: Not on file    Number of [place] in the Last Year: Not on file    Unstable Housing in the Last Year: Not on file       Family History   Problem Relation Age of Onset    No Known Problems Mother     No Known Problems Father        Allergies:  Patient has no known allergies. Home Medications:  Prior to Admission medications    Medication Sig Start Date End Date Taking?  Authorizing Provider   ibuprofen (ADVIL;MOTRIN) 800 MG tablet Take 1 tablet by mouth every 8 hours as needed for Pain 4/16/22   Aleah Painting MD   acetaminophen (AMINOFEN) 325 MG tablet Take 2 tablets by mouth every 6 hours as needed for Pain 4/16/22   Maria Luisa Barnes MD       REVIEW OF SYSTEMS    (2-9 systems for level 4, 10 or more for level 5)      Review of Systems   Constitutional: Negative for chills, fatigue and fever. Respiratory: Negative for shortness of breath. Cardiovascular: Negative for chest pain. Gastrointestinal: Negative for abdominal pain, nausea and vomiting. Musculoskeletal: Negative for arthralgias, joint swelling and myalgias. Skin: Positive for wound. Negative for color change. Neurological: Negative for dizziness, weakness, light-headedness, numbness and headaches. Psychiatric/Behavioral: Negative for confusion. PHYSICAL EXAM   (up to 7 for level 4, 8 or more for level 5)      INITIAL VITALS:   BP (!) 141/86   Pulse 62   Temp 97 °F (36.1 °C) (Oral)   Resp 15   SpO2 99%     Physical Exam  Vitals and nursing note reviewed. Constitutional:       Appearance: Normal appearance. He is well-developed. HENT:      Head: Normocephalic and atraumatic. Right Ear: External ear normal.      Left Ear: External ear normal.   Neck:      Trachea: Trachea normal. No tracheal deviation. Cardiovascular:      Rate and Rhythm: Normal rate. Pulmonary:      Effort: Pulmonary effort is normal. No respiratory distress. Musculoskeletal:         General: No deformity. Normal range of motion. Cervical back: Normal range of motion. No rigidity. Skin:     General: Skin is warm and dry. Capillary Refill: Capillary refill takes less than 2 seconds. Comments: 1 cm laceration over the fat pad of the left thumb with entirety of the edges of the avulsed skin stiffened and curling from drying out and devascularization. No gross contamination, no active bleeding. Neurological:      General: No focal deficit present. Mental Status: He is alert and oriented to person, place, and time.    Psychiatric:         Mood and Affect: Mood normal.         Behavior: Behavior normal.         DIFFERENTIAL  DIAGNOSIS     PLAN (Dona Cordero / Kike Frances / EKG):  Orders Placed This Encounter   Procedures    XR HAND LEFT (MIN 3 VIEWS)       MEDICATIONS ORDERED:  Orders Placed This Encounter   Medications    Tetanus-Diphth-Acell Pertussis (BOOSTRIX) injection 0.5 mL    acetaminophen (TYLENOL) tablet 1,000 mg    Tetanus-Diphth-Acell Pertussis ([address]) 5-2.5-18.5 LF-MCG/0.5 injection     Alejandra Grangergan: cabinet override       DDX: Laceration with without contamination with without foreign body with or without underlying fracture    DIAGNOSTIC RESULTS / [address] [place] [address] / [facility]     LABS:  No results found for this visit on 04/18/22. RADIOLOGY:  XR HAND LEFT (MIN 3 VIEWS)    Result Date: 4/18/2022  EXAMINATION: THREE XRAY VIEWS OF THE LEFT HAND 4/18/2022 2:56 pm COMPARISON: None. HISTORY: ORDERING SYSTEM PROVIDED HISTORY: thumb laceration TECHNOLOGIST PROVIDED HISTORY: thumb laceration FINDINGS: The visualized bones are normal.  There is no evidence of fracture or dislocation. . The  joint spaces appear well maintained. The soft tissues are unremarkable. No acute bony abnormalities are noted       EKG  None    All EKG's are interpreted by the Emergency Department Physician who either signs or Co-signs this chart in the absence of a cardiologist.    EMERGENCY DEPARTMENT COURSE:  Patient wanted up in bed, no acute distress, not ill or toxic appearing. Engaged in cooperative exam.  Physical exam notable for stable vitals, laceration to the left thumb appears as expected happening approximately 1 day ago. Devascularized edges are firm and curled up, not amenable to repair. Wound cleaned, x-ray performed to evaluate for contamination, radiopaque foreign body, underlying fracture, no collection was obvious or evident. Wound cleaned and dressed with bacitracin and Xeroform gauze with dressing applied. Patient follow-up with PCP as needed, educated on healing process of secondary intention. No risk factors requiring antibiotics, tetanus updated. Discharge plan discussed with patient who is in agreement. Educated on likely pathology, medications, return precautions, and follow-up. Patient understood all educated materials with all questions answered to their satisfaction. PROCEDURES:  None    CONSULTS:  None    CRITICAL CARE:  None    FINAL IMPRESSION      1.  Laceration of left thumb, initial encounter          DISPOSITION / PLAN     DISPOSITION        PATIENT REFERRED TO:  Albin PANIAGUA [address]  [address]    Schedule an appointment as soon as possible for a visit   As needed, Regarding this visit    [facility]  [address] [place] [address]  [phone]  Go to   If symptoms worsen      DISCHARGE MEDICATIONS:  Discharge Medication List as of 4/18/2022  6:22 PM          Justin Anthony MD  Emergency Medicine Resident    (Please note that portions of this note were completed with a voice recognition program.  Efforts were made to edit the dictations but occasionally words are mis-transcribed.)        Justin Anthony MD  Resident  04/18/22 1945

## (undated) DEVICE — KIT DRN FLAT W/ 100CC EVAC 10MM FULL PERF

## (undated) DEVICE — STAPLER INT L16CM STD UNIV RELD DISP TRI-STAPLE ENDO GIA

## (undated) DEVICE — ADHESIVE SKIN CLOSURE TOP 36 CC HI VISC DERMBND MINI

## (undated) DEVICE — GLOVE ORTHO 7 1/2   MSG9475

## (undated) DEVICE — SCISSOR SURG CRV ENDOCUT TIP FOR LAP DISP

## (undated) DEVICE — SUTURE MCRYL + SZ 4-0 L27IN ABSRB UD L19MM PS-2 3/8 CIR MCP426H

## (undated) DEVICE — RELOAD STPL 3.5MM L60MM 0DEG UNIV TISS PUR TI 6 ROW LIN

## (undated) DEVICE — DEFENDO AIR WATER SUCTION AND BIOPSY VALVE KIT FOR  OLYMPUS: Brand: DEFENDO AIR/WATER/SUCTION AND BIOPSY VALVE

## (undated) DEVICE — TROCAR: Brand: KII FIOS FIRST ENTRY

## (undated) DEVICE — ENDO KIT W/SYRINGE: Brand: MEDLINE INDUSTRIES, INC.

## (undated) DEVICE — SUTURE PERMAHAND SZ 0 L30IN NONABSORBABLE BLK FSL L30MM 3/8 680H

## (undated) DEVICE — SUTURE VCRL + SZ 0 L27IN ABSRB VLT L26MM UR-6 5/8 CIR VCP603H

## (undated) DEVICE — SUTURE PDS II SZ 0 L27IN ABSRB VLT UR-6 L26MM 1/2 CIR D7185

## (undated) DEVICE — SYRINGE MED 20ML STD CLR PLAS LUERSLIP TIP N CTRL DISP

## (undated) DEVICE — ELECTRODE LAPAROSCOPIC LHOOK

## (undated) DEVICE — TOTAL TRAY, DB, 100% SILI FOLEY, 16FR 10: Brand: MEDLINE

## (undated) DEVICE — GLOVE ORANGE PI 7 1/2   MSG9075

## (undated) DEVICE — TROCARS: Brand: KII® BALLOON BLUNT TIP SYSTEM

## (undated) DEVICE — SEALER ENDOSCP L37CM NANO COAT BLNT TIP LAP DIV

## (undated) DEVICE — SPONGE DRN W4XL4IN RAYON/POLYESTER 6 PLY NONWOVEN PRECUT

## (undated) DEVICE — DISSECTOR LAP DIA5MM BLNT TIP ENDOPATH

## (undated) DEVICE — BAG SPEC REM 224ML W4XL6IN DIA10MM 1 HND GYN DISP ENDOPCH

## (undated) DEVICE — ST CHARLES GEN LAPAROSCOPY PK: Brand: MEDLINE INDUSTRIES, INC.

## (undated) DEVICE — MERCY HEALTH ST CHARLES: Brand: MEDLINE INDUSTRIES, INC.